# Patient Record
Sex: MALE | Race: WHITE | Employment: FULL TIME | URBAN - NONMETROPOLITAN AREA
[De-identification: names, ages, dates, MRNs, and addresses within clinical notes are randomized per-mention and may not be internally consistent; named-entity substitution may affect disease eponyms.]

---

## 2022-12-02 ENCOUNTER — HOSPITAL ENCOUNTER (OUTPATIENT)
Dept: PREADMISSION TESTING | Age: 57
End: 2022-12-02
Attending: ORTHOPAEDIC SURGERY | Admitting: ORTHOPAEDIC SURGERY
Payer: COMMERCIAL

## 2022-12-02 VITALS
SYSTOLIC BLOOD PRESSURE: 174 MMHG | OXYGEN SATURATION: 97 % | WEIGHT: 285.2 LBS | TEMPERATURE: 97.6 F | DIASTOLIC BLOOD PRESSURE: 101 MMHG | BODY MASS INDEX: 42.24 KG/M2 | HEART RATE: 66 BPM | HEIGHT: 69 IN | RESPIRATION RATE: 20 BRPM

## 2022-12-02 LAB
ABO/RH: NORMAL
ABSOLUTE EOS #: 0.27 K/UL (ref 0–0.44)
ABSOLUTE IMMATURE GRANULOCYTE: <0.03 K/UL (ref 0–0.3)
ABSOLUTE LYMPH #: 1.92 K/UL (ref 1.1–3.7)
ABSOLUTE MONO #: 0.48 K/UL (ref 0.1–1.2)
ANION GAP SERPL CALCULATED.3IONS-SCNC: 9 MMOL/L (ref 9–17)
ANTIBODY SCREEN: NEGATIVE
ARM BAND NUMBER: NORMAL
BASOPHILS # BLD: 1 % (ref 0–2)
BASOPHILS ABSOLUTE: 0.08 K/UL (ref 0–0.2)
BUN BLDV-MCNC: 17 MG/DL (ref 6–20)
BUN/CREAT BLD: 17 (ref 9–20)
CALCIUM SERPL-MCNC: 9.8 MG/DL (ref 8.6–10.4)
CHLORIDE BLD-SCNC: 101 MMOL/L (ref 98–107)
CO2: 28 MMOL/L (ref 20–31)
CREAT SERPL-MCNC: 1.01 MG/DL (ref 0.7–1.2)
EOSINOPHILS RELATIVE PERCENT: 4 % (ref 1–4)
EXPIRATION DATE: NORMAL
GFR SERPL CREATININE-BSD FRML MDRD: >60 ML/MIN/1.73M2
GLUCOSE BLD-MCNC: 146 MG/DL (ref 70–99)
HCT VFR BLD CALC: 46.3 % (ref 40.7–50.3)
HEMOGLOBIN: 15.3 G/DL (ref 13–17)
IMMATURE GRANULOCYTES: 0 %
LYMPHOCYTES # BLD: 29 % (ref 24–43)
MCH RBC QN AUTO: 29.8 PG (ref 25.2–33.5)
MCHC RBC AUTO-ENTMCNC: 33 G/DL (ref 28.4–34.8)
MCV RBC AUTO: 90.3 FL (ref 82.6–102.9)
MONOCYTES # BLD: 7 % (ref 3–12)
NRBC AUTOMATED: 0 PER 100 WBC
PDW BLD-RTO: 12.7 % (ref 11.8–14.4)
PLATELET # BLD: 316 K/UL (ref 138–453)
PMV BLD AUTO: 10.3 FL (ref 8.1–13.5)
POTASSIUM SERPL-SCNC: 3.8 MMOL/L (ref 3.7–5.3)
RBC # BLD: 5.13 M/UL (ref 4.21–5.77)
SEG NEUTROPHILS: 59 % (ref 36–65)
SEGMENTED NEUTROPHILS ABSOLUTE COUNT: 3.92 K/UL (ref 1.5–8.1)
SODIUM BLD-SCNC: 138 MMOL/L (ref 135–144)
WBC # BLD: 6.7 K/UL (ref 3.5–11.3)

## 2022-12-02 PROCEDURE — 86900 BLOOD TYPING SEROLOGIC ABO: CPT

## 2022-12-02 PROCEDURE — 87641 MR-STAPH DNA AMP PROBE: CPT

## 2022-12-02 PROCEDURE — 36415 COLL VENOUS BLD VENIPUNCTURE: CPT

## 2022-12-02 PROCEDURE — 83036 HEMOGLOBIN GLYCOSYLATED A1C: CPT

## 2022-12-02 PROCEDURE — 85025 COMPLETE CBC W/AUTO DIFF WBC: CPT

## 2022-12-02 PROCEDURE — 86850 RBC ANTIBODY SCREEN: CPT

## 2022-12-02 PROCEDURE — 93005 ELECTROCARDIOGRAM TRACING: CPT | Performed by: ORTHOPAEDIC SURGERY

## 2022-12-02 PROCEDURE — 80048 BASIC METABOLIC PNL TOTAL CA: CPT

## 2022-12-02 PROCEDURE — 86901 BLOOD TYPING SEROLOGIC RH(D): CPT

## 2022-12-02 RX ORDER — CELECOXIB 200 MG/1
400 CAPSULE ORAL ONCE
OUTPATIENT
Start: 2022-12-02 | End: 2022-12-02

## 2022-12-02 RX ORDER — HYDROCHLOROTHIAZIDE 25 MG/1
25 TABLET ORAL DAILY
COMMUNITY

## 2022-12-02 RX ORDER — CEFAZOLIN SODIUM 1 G/50ML
1000 SOLUTION INTRAVENOUS ONCE
OUTPATIENT
Start: 2022-12-02 | End: 2022-12-02

## 2022-12-02 RX ORDER — METOPROLOL TARTRATE 50 MG/1
50 TABLET, FILM COATED ORAL DAILY
COMMUNITY

## 2022-12-02 RX ORDER — SIMVASTATIN 20 MG
20 TABLET ORAL DAILY
COMMUNITY

## 2022-12-02 RX ORDER — GLIPIZIDE 10 MG/1
10 TABLET, FILM COATED, EXTENDED RELEASE ORAL DAILY
COMMUNITY

## 2022-12-02 RX ORDER — TRANEXAMIC ACID 650 MG/1
1300 TABLET ORAL ONCE
OUTPATIENT
Start: 2022-12-02 | End: 2022-12-02

## 2022-12-02 RX ORDER — SODIUM CHLORIDE, SODIUM LACTATE, POTASSIUM CHLORIDE, CALCIUM CHLORIDE 600; 310; 30; 20 MG/100ML; MG/100ML; MG/100ML; MG/100ML
INJECTION, SOLUTION INTRAVENOUS CONTINUOUS
OUTPATIENT
Start: 2022-12-02

## 2022-12-02 RX ORDER — ACETAMINOPHEN 325 MG/1
650 TABLET ORAL ONCE
OUTPATIENT
Start: 2022-12-02 | End: 2022-12-02

## 2022-12-02 RX ORDER — COVID-19 ANTIGEN TEST
KIT MISCELLANEOUS
COMMUNITY

## 2022-12-02 ASSESSMENT — PAIN DESCRIPTION - PAIN TYPE: TYPE: CHRONIC PAIN

## 2022-12-02 NOTE — PROGRESS NOTES
Patient instructed [facility]   Preadmission Testing    Name: Cherelle Neville  : 1965  Patient Phone: [phone] (home) [phone] (work)    Procedure left tka  Date of Procedure: 22  Surgeon: Liliana Moctezuma MD    Ht:  5' 9\" (175.3 cm)  Wt: 285 lb 3.2 oz (129.4 kg)  Wt method: Actual    Allergies: No Known Allergies    Peanut allergy: No         Vitals:    22 1308   BP: (!) 163/99   Pulse: 66   Resp: 20   Temp: 97.6 °F (36.4 °C)   SpO2: 97%       No LMP for male patient. Do you take blood thinners? [] Yes    [x] No         Instructed to stop blood thinners prior to procedure? [x] Yes    [] No      [] N/A   Do you have sleep apnea? [] Yes    [x] No     Instructed to bring CPAP machine? [] Yes    [] No    [x] N/A   Do you have acid reflux ? [] Yes    [x] No     Do you have  hiatal hernia? [] Yes    [x] No    Do you ever experience motion sickness? [] Yes    [x] No     Have you had a respiratory infection or sore throat in last 4 weeks before surgery? [] Yes    [x] No     Do you have poorly controlled asthma or COPD? [] Yes    [x] No     Do you have a history of angina in the last month or symptomatic arrhythmia? [] Yes    [x] No     Do you have significant central nervous system disease? [] Yes    [x] No     Have you had an EKG, labs, or chest xray in last 12 months? If yes provide copies to anesthesia   [] Yes    [x] No       [] Lab    [] EKG    [] CXR     Have you had a stress test?     [x] Yes    [] No    When/where: 5 years ago [facility]    Was it normal?    [x] Yes    [] No   Do you or your family have a history of Malignant Hyperthermia? [] Yes    [x] No     Patient instructed on:   [x] NPO Status   [x] Meds to Take Day of Surgery  [x] Ride Home  [x]No Jewelry/Contact Lenses/Dentures day of surgery   [x] Chlorhexidene                       Patient instructed on the pre-operative, intra-operative, and post-operative process?    Yes  Medication instructions reviewed with patient? Yes  Pre operative instruction sheet reviewed and given to patient in PAT?   Yes

## 2022-12-03 LAB
MRSA, DNA, NASAL: NEGATIVE
SPECIMEN DESCRIPTION: NORMAL

## 2022-12-04 LAB
EKG ATRIAL RATE: 66 BPM
EKG P AXIS: 72 DEGREES
EKG P-R INTERVAL: 176 MS
EKG Q-T INTERVAL: 428 MS
EKG QRS DURATION: 100 MS
EKG QTC CALCULATION (BAZETT): 448 MS
EKG R AXIS: 29 DEGREES
EKG T AXIS: 17 DEGREES
EKG VENTRICULAR RATE: 66 BPM
ESTIMATED AVERAGE GLUCOSE: 120 MG/DL
HBA1C MFR BLD: 5.8 % (ref 4–6)

## 2022-12-04 PROCEDURE — 93010 ELECTROCARDIOGRAM REPORT: CPT | Performed by: INTERNAL MEDICINE

## 2022-12-16 ENCOUNTER — ANESTHESIA EVENT (OUTPATIENT)
Dept: OPERATING ROOM | Age: 57
End: 2022-12-16
Payer: COMMERCIAL

## 2022-12-19 ENCOUNTER — ANESTHESIA (OUTPATIENT)
Dept: OPERATING ROOM | Age: 57
End: 2022-12-19
Payer: COMMERCIAL

## 2022-12-19 ENCOUNTER — HOSPITAL ENCOUNTER (OUTPATIENT)
Age: 57
Setting detail: OBSERVATION
Discharge: HOME | End: 2022-12-20
Attending: ORTHOPAEDIC SURGERY | Admitting: ORTHOPAEDIC SURGERY
Payer: COMMERCIAL

## 2022-12-19 DIAGNOSIS — Z96.652 S/P TOTAL KNEE REPLACEMENT USING CEMENT, LEFT: Primary | ICD-10-CM

## 2022-12-19 PROBLEM — I10 HYPERTENSION: Status: ACTIVE | Noted: 2022-12-19

## 2022-12-19 PROBLEM — E11.9 DIABETES MELLITUS (HCC): Status: ACTIVE | Noted: 2022-12-19

## 2022-12-19 LAB
GLUCOSE BLD-MCNC: 244 MG/DL (ref 74–100)
GLUCOSE BLD-MCNC: 384 MG/DL (ref 74–100)

## 2022-12-19 PROCEDURE — 6360000002 HC RX W HCPCS: Performed by: ORTHOPAEDIC SURGERY

## 2022-12-19 PROCEDURE — 6370000000 HC RX 637 (ALT 250 FOR IP): Performed by: NURSE PRACTITIONER

## 2022-12-19 PROCEDURE — 6370000000 HC RX 637 (ALT 250 FOR IP): Performed by: ORTHOPAEDIC SURGERY

## 2022-12-19 PROCEDURE — 2580000003 HC RX 258: Performed by: NURSE ANESTHETIST, CERTIFIED REGISTERED

## 2022-12-19 PROCEDURE — 36415 COLL VENOUS BLD VENIPUNCTURE: CPT

## 2022-12-19 PROCEDURE — 82947 ASSAY GLUCOSE BLOOD QUANT: CPT

## 2022-12-19 PROCEDURE — 6360000002 HC RX W HCPCS: Performed by: NURSE ANESTHETIST, CERTIFIED REGISTERED

## 2022-12-19 PROCEDURE — 2580000003 HC RX 258: Performed by: ORTHOPAEDIC SURGERY

## 2022-12-19 PROCEDURE — 76942 ECHO GUIDE FOR BIOPSY: CPT | Performed by: NURSE ANESTHETIST, CERTIFIED REGISTERED

## 2022-12-19 PROCEDURE — G0378 HOSPITAL OBSERVATION PER HR: HCPCS

## 2022-12-19 PROCEDURE — 2500000003 HC RX 250 WO HCPCS: Performed by: NURSE ANESTHETIST, CERTIFIED REGISTERED

## 2022-12-19 PROCEDURE — 6370000000 HC RX 637 (ALT 250 FOR IP): Performed by: NURSE ANESTHETIST, CERTIFIED REGISTERED

## 2022-12-19 PROCEDURE — A4216 STERILE WATER/SALINE, 10 ML: HCPCS | Performed by: ORTHOPAEDIC SURGERY

## 2022-12-19 PROCEDURE — 97161 PT EVAL LOW COMPLEX 20 MIN: CPT

## 2022-12-19 PROCEDURE — 83036 HEMOGLOBIN GLYCOSYLATED A1C: CPT

## 2022-12-19 PROCEDURE — A4216 STERILE WATER/SALINE, 10 ML: HCPCS | Performed by: NURSE ANESTHETIST, CERTIFIED REGISTERED

## 2022-12-19 PROCEDURE — 97166 OT EVAL MOD COMPLEX 45 MIN: CPT

## 2022-12-19 PROCEDURE — 2500000003 HC RX 250 WO HCPCS: Performed by: ORTHOPAEDIC SURGERY

## 2022-12-19 RX ORDER — KETOROLAC TROMETHAMINE 30 MG/ML
INJECTION, SOLUTION INTRAMUSCULAR; INTRAVENOUS PRN
Status: DISCONTINUED | OUTPATIENT
Start: 2022-12-19 | End: 2022-12-19 | Stop reason: SDUPTHER

## 2022-12-19 RX ORDER — ENOXAPARIN SODIUM 100 MG/ML
30 INJECTION SUBCUTANEOUS 2 TIMES DAILY
Status: DISCONTINUED | OUTPATIENT
Start: 2022-12-20 | End: 2022-12-20 | Stop reason: HOSPADM

## 2022-12-19 RX ORDER — HYDROCODONE BITARTRATE AND ACETAMINOPHEN 5; 325 MG/1; MG/1
1 TABLET ORAL EVERY 4 HOURS PRN
Status: DISCONTINUED | OUTPATIENT
Start: 2022-12-19 | End: 2022-12-20 | Stop reason: HOSPADM

## 2022-12-19 RX ORDER — SODIUM CHLORIDE 9 MG/ML
INJECTION INTRAVENOUS PRN
Status: DISCONTINUED | OUTPATIENT
Start: 2022-12-19 | End: 2022-12-19 | Stop reason: SDUPTHER

## 2022-12-19 RX ORDER — GENTAMICIN SULFATE 40 MG/ML
INJECTION, SOLUTION INTRAMUSCULAR; INTRAVENOUS PRN
Status: DISCONTINUED | OUTPATIENT
Start: 2022-12-19 | End: 2022-12-19 | Stop reason: ALTCHOICE

## 2022-12-19 RX ORDER — HYDROCODONE BITARTRATE AND ACETAMINOPHEN 5; 325 MG/1; MG/1
1 TABLET ORAL EVERY 4 HOURS PRN
Status: DISCONTINUED | OUTPATIENT
Start: 2022-12-19 | End: 2022-12-19

## 2022-12-19 RX ORDER — SODIUM CHLORIDE 0.9 % (FLUSH) 0.9 %
5-40 SYRINGE (ML) INJECTION EVERY 12 HOURS SCHEDULED
Status: DISCONTINUED | OUTPATIENT
Start: 2022-12-19 | End: 2022-12-19 | Stop reason: HOSPADM

## 2022-12-19 RX ORDER — GLIPIZIDE 10 MG/1
10 TABLET ORAL 2 TIMES DAILY
COMMUNITY
Start: 2021-11-05

## 2022-12-19 RX ORDER — SODIUM CHLORIDE 0.9 % (FLUSH) 0.9 %
5-40 SYRINGE (ML) INJECTION PRN
Status: DISCONTINUED | OUTPATIENT
Start: 2022-12-19 | End: 2022-12-20 | Stop reason: HOSPADM

## 2022-12-19 RX ORDER — HYDROCHLOROTHIAZIDE 25 MG/1
25 TABLET ORAL DAILY
Status: DISCONTINUED | OUTPATIENT
Start: 2022-12-19 | End: 2022-12-20 | Stop reason: HOSPADM

## 2022-12-19 RX ORDER — DIMENHYDRINATE 50 MG/1
50 TABLET ORAL ONCE
Status: COMPLETED | OUTPATIENT
Start: 2022-12-19 | End: 2022-12-19

## 2022-12-19 RX ORDER — ACETAMINOPHEN 325 MG/1
650 TABLET ORAL EVERY 6 HOURS PRN
Status: DISCONTINUED | OUTPATIENT
Start: 2022-12-19 | End: 2022-12-20 | Stop reason: HOSPADM

## 2022-12-19 RX ORDER — FENTANYL CITRATE 50 UG/ML
25 INJECTION, SOLUTION INTRAMUSCULAR; INTRAVENOUS EVERY 5 MIN PRN
Status: DISCONTINUED | OUTPATIENT
Start: 2022-12-19 | End: 2022-12-19 | Stop reason: HOSPADM

## 2022-12-19 RX ORDER — FENTANYL CITRATE 50 UG/ML
INJECTION, SOLUTION INTRAMUSCULAR; INTRAVENOUS PRN
Status: DISCONTINUED | OUTPATIENT
Start: 2022-12-19 | End: 2022-12-19 | Stop reason: SDUPTHER

## 2022-12-19 RX ORDER — HYDROCODONE BITARTRATE AND ACETAMINOPHEN 5; 325 MG/1; MG/1
2 TABLET ORAL EVERY 6 HOURS PRN
Status: DISCONTINUED | OUTPATIENT
Start: 2022-12-19 | End: 2022-12-19

## 2022-12-19 RX ORDER — ONDANSETRON 2 MG/ML
4 INJECTION INTRAMUSCULAR; INTRAVENOUS EVERY 6 HOURS PRN
Status: DISCONTINUED | OUTPATIENT
Start: 2022-12-19 | End: 2022-12-20 | Stop reason: HOSPADM

## 2022-12-19 RX ORDER — LIDOCAINE HYDROCHLORIDE 20 MG/ML
INJECTION, SOLUTION EPIDURAL; INFILTRATION; INTRACAUDAL; PERINEURAL PRN
Status: DISCONTINUED | OUTPATIENT
Start: 2022-12-19 | End: 2022-12-19 | Stop reason: SDUPTHER

## 2022-12-19 RX ORDER — ONDANSETRON 2 MG/ML
4 INJECTION INTRAMUSCULAR; INTRAVENOUS
Status: DISCONTINUED | OUTPATIENT
Start: 2022-12-19 | End: 2022-12-19 | Stop reason: HOSPADM

## 2022-12-19 RX ORDER — NEOSTIGMINE METHYLSULFATE 1 MG/ML
INJECTION, SOLUTION INTRAVENOUS PRN
Status: DISCONTINUED | OUTPATIENT
Start: 2022-12-19 | End: 2022-12-19 | Stop reason: SDUPTHER

## 2022-12-19 RX ORDER — ONDANSETRON 2 MG/ML
INJECTION INTRAMUSCULAR; INTRAVENOUS PRN
Status: DISCONTINUED | OUTPATIENT
Start: 2022-12-19 | End: 2022-12-19 | Stop reason: SDUPTHER

## 2022-12-19 RX ORDER — BUPIVACAINE/KETOROLAC/KETAMINE 150-60/50
SYRINGE (ML) INJECTION PRN
Status: DISCONTINUED | OUTPATIENT
Start: 2022-12-19 | End: 2022-12-19 | Stop reason: ALTCHOICE

## 2022-12-19 RX ORDER — DEXTROSE MONOHYDRATE 100 MG/ML
INJECTION, SOLUTION INTRAVENOUS CONTINUOUS PRN
Status: DISCONTINUED | OUTPATIENT
Start: 2022-12-19 | End: 2022-12-20 | Stop reason: HOSPADM

## 2022-12-19 RX ORDER — PROPOFOL 10 MG/ML
INJECTION, EMULSION INTRAVENOUS PRN
Status: DISCONTINUED | OUTPATIENT
Start: 2022-12-19 | End: 2022-12-19 | Stop reason: SDUPTHER

## 2022-12-19 RX ORDER — HYDROCODONE BITARTRATE AND ACETAMINOPHEN 5; 325 MG/1; MG/1
2 TABLET ORAL EVERY 6 HOURS PRN
Status: DISCONTINUED | OUTPATIENT
Start: 2022-12-19 | End: 2022-12-20 | Stop reason: HOSPADM

## 2022-12-19 RX ORDER — GLIPIZIDE 5 MG/1
10 TABLET ORAL 2 TIMES DAILY
Status: DISCONTINUED | OUTPATIENT
Start: 2022-12-19 | End: 2022-12-20 | Stop reason: HOSPADM

## 2022-12-19 RX ORDER — SODIUM CHLORIDE 0.9 % (FLUSH) 0.9 %
5-40 SYRINGE (ML) INJECTION PRN
Status: DISCONTINUED | OUTPATIENT
Start: 2022-12-19 | End: 2022-12-19 | Stop reason: HOSPADM

## 2022-12-19 RX ORDER — MIDAZOLAM HYDROCHLORIDE 1 MG/ML
INJECTION INTRAMUSCULAR; INTRAVENOUS PRN
Status: DISCONTINUED | OUTPATIENT
Start: 2022-12-19 | End: 2022-12-19 | Stop reason: SDUPTHER

## 2022-12-19 RX ORDER — DEXAMETHASONE SODIUM PHOSPHATE 4 MG/ML
INJECTION, SOLUTION INTRA-ARTICULAR; INTRALESIONAL; INTRAMUSCULAR; INTRAVENOUS; SOFT TISSUE PRN
Status: DISCONTINUED | OUTPATIENT
Start: 2022-12-19 | End: 2022-12-19 | Stop reason: SDUPTHER

## 2022-12-19 RX ORDER — TRANEXAMIC ACID 650 MG/1
1300 TABLET ORAL ONCE
Status: COMPLETED | OUTPATIENT
Start: 2022-12-19 | End: 2022-12-19

## 2022-12-19 RX ORDER — SODIUM CHLORIDE 9 MG/ML
INJECTION, SOLUTION INTRAVENOUS PRN
Status: DISCONTINUED | OUTPATIENT
Start: 2022-12-19 | End: 2022-12-19 | Stop reason: HOSPADM

## 2022-12-19 RX ORDER — DEXAMETHASONE SODIUM PHOSPHATE 10 MG/ML
INJECTION, SOLUTION INTRAMUSCULAR; INTRAVENOUS PRN
Status: DISCONTINUED | OUTPATIENT
Start: 2022-12-19 | End: 2022-12-19 | Stop reason: SDUPTHER

## 2022-12-19 RX ORDER — CELECOXIB 200 MG/1
400 CAPSULE ORAL ONCE
Status: DISCONTINUED | OUTPATIENT
Start: 2022-12-19 | End: 2022-12-19 | Stop reason: ALTCHOICE

## 2022-12-19 RX ORDER — CELECOXIB 200 MG/1
400 CAPSULE ORAL ONCE
Status: COMPLETED | OUTPATIENT
Start: 2022-12-19 | End: 2022-12-19

## 2022-12-19 RX ORDER — INSULIN LISPRO 100 [IU]/ML
0-4 INJECTION, SOLUTION INTRAVENOUS; SUBCUTANEOUS
Status: DISCONTINUED | OUTPATIENT
Start: 2022-12-19 | End: 2022-12-20 | Stop reason: HOSPADM

## 2022-12-19 RX ORDER — METOPROLOL TARTRATE 50 MG/1
50 TABLET, FILM COATED ORAL 2 TIMES DAILY
Status: DISCONTINUED | OUTPATIENT
Start: 2022-12-19 | End: 2022-12-20 | Stop reason: HOSPADM

## 2022-12-19 RX ORDER — GENTAMICIN SULFATE 40 MG/ML
INJECTION, SOLUTION INTRAMUSCULAR; INTRAVENOUS
Status: DISCONTINUED
Start: 2022-12-19 | End: 2022-12-19

## 2022-12-19 RX ORDER — ATORVASTATIN CALCIUM 10 MG/1
10 TABLET, FILM COATED ORAL DAILY
Status: DISCONTINUED | OUTPATIENT
Start: 2022-12-19 | End: 2022-12-20 | Stop reason: HOSPADM

## 2022-12-19 RX ORDER — HYDROMORPHONE HCL 110MG/55ML
0.5 PATIENT CONTROLLED ANALGESIA SYRINGE INTRAVENOUS
Status: DISCONTINUED | OUTPATIENT
Start: 2022-12-19 | End: 2022-12-20 | Stop reason: HOSPADM

## 2022-12-19 RX ORDER — ACETAMINOPHEN 325 MG/1
650 TABLET ORAL EVERY 6 HOURS
Status: DISCONTINUED | OUTPATIENT
Start: 2022-12-19 | End: 2022-12-20 | Stop reason: HOSPADM

## 2022-12-19 RX ORDER — TRANEXAMIC ACID 100 MG/ML
INJECTION, SOLUTION INTRAVENOUS
Status: DISCONTINUED
Start: 2022-12-19 | End: 2022-12-19 | Stop reason: WASHOUT

## 2022-12-19 RX ORDER — ROPIVACAINE HYDROCHLORIDE 5 MG/ML
INJECTION, SOLUTION EPIDURAL; INFILTRATION; PERINEURAL PRN
Status: DISCONTINUED | OUTPATIENT
Start: 2022-12-19 | End: 2022-12-19 | Stop reason: SDUPTHER

## 2022-12-19 RX ORDER — SODIUM CHLORIDE 0.9 % (FLUSH) 0.9 %
5-40 SYRINGE (ML) INJECTION EVERY 12 HOURS SCHEDULED
Status: DISCONTINUED | OUTPATIENT
Start: 2022-12-19 | End: 2022-12-20 | Stop reason: HOSPADM

## 2022-12-19 RX ORDER — VANCOMYCIN HYDROCHLORIDE 1 G/20ML
INJECTION, POWDER, LYOPHILIZED, FOR SOLUTION INTRAVENOUS PRN
Status: DISCONTINUED | OUTPATIENT
Start: 2022-12-19 | End: 2022-12-19 | Stop reason: ALTCHOICE

## 2022-12-19 RX ORDER — INSULIN LISPRO 100 [IU]/ML
0-4 INJECTION, SOLUTION INTRAVENOUS; SUBCUTANEOUS NIGHTLY
Status: DISCONTINUED | OUTPATIENT
Start: 2022-12-19 | End: 2022-12-20 | Stop reason: HOSPADM

## 2022-12-19 RX ORDER — VANCOMYCIN HYDROCHLORIDE 1 G/20ML
INJECTION, POWDER, LYOPHILIZED, FOR SOLUTION INTRAVENOUS
Status: DISCONTINUED
Start: 2022-12-19 | End: 2022-12-19

## 2022-12-19 RX ORDER — SODIUM CHLORIDE, SODIUM LACTATE, POTASSIUM CHLORIDE, CALCIUM CHLORIDE 600; 310; 30; 20 MG/100ML; MG/100ML; MG/100ML; MG/100ML
INJECTION, SOLUTION INTRAVENOUS CONTINUOUS
Status: DISCONTINUED | OUTPATIENT
Start: 2022-12-19 | End: 2022-12-20 | Stop reason: HOSPADM

## 2022-12-19 RX ORDER — TRANEXAMIC ACID 650 MG/1
1300 TABLET ORAL ONCE
Status: COMPLETED | OUTPATIENT
Start: 2022-12-20 | End: 2022-12-20

## 2022-12-19 RX ORDER — ROCURONIUM BROMIDE 10 MG/ML
INJECTION, SOLUTION INTRAVENOUS PRN
Status: DISCONTINUED | OUTPATIENT
Start: 2022-12-19 | End: 2022-12-19 | Stop reason: SDUPTHER

## 2022-12-19 RX ORDER — SODIUM CHLORIDE, SODIUM LACTATE, POTASSIUM CHLORIDE, CALCIUM CHLORIDE 600; 310; 30; 20 MG/100ML; MG/100ML; MG/100ML; MG/100ML
INJECTION, SOLUTION INTRAVENOUS CONTINUOUS
Status: DISCONTINUED | OUTPATIENT
Start: 2022-12-19 | End: 2022-12-19

## 2022-12-19 RX ORDER — SODIUM CHLORIDE 9 MG/ML
INJECTION INTRAVENOUS PRN
Status: DISCONTINUED | OUTPATIENT
Start: 2022-12-19 | End: 2022-12-19 | Stop reason: ALTCHOICE

## 2022-12-19 RX ORDER — BUPIVACAINE/KETOROLAC/KETAMINE 150-60/50
SYRINGE (ML) INJECTION
Status: DISCONTINUED
Start: 2022-12-19 | End: 2022-12-19

## 2022-12-19 RX ORDER — ACETAMINOPHEN 325 MG/1
650 TABLET ORAL ONCE
Status: COMPLETED | OUTPATIENT
Start: 2022-12-19 | End: 2022-12-19

## 2022-12-19 RX ORDER — GLYCOPYRROLATE 0.2 MG/ML
INJECTION INTRAMUSCULAR; INTRAVENOUS PRN
Status: DISCONTINUED | OUTPATIENT
Start: 2022-12-19 | End: 2022-12-19 | Stop reason: SDUPTHER

## 2022-12-19 RX ORDER — PHENYLEPHRINE HYDROCHLORIDE 10 MG/ML
INJECTION INTRAVENOUS PRN
Status: DISCONTINUED | OUTPATIENT
Start: 2022-12-19 | End: 2022-12-19 | Stop reason: SDUPTHER

## 2022-12-19 RX ORDER — SODIUM CHLORIDE 9 MG/ML
INJECTION, SOLUTION INTRAVENOUS PRN
Status: DISCONTINUED | OUTPATIENT
Start: 2022-12-19 | End: 2022-12-20 | Stop reason: HOSPADM

## 2022-12-19 RX ORDER — FENTANYL CITRATE 50 UG/ML
50 INJECTION, SOLUTION INTRAMUSCULAR; INTRAVENOUS EVERY 5 MIN PRN
Status: DISCONTINUED | OUTPATIENT
Start: 2022-12-19 | End: 2022-12-19 | Stop reason: HOSPADM

## 2022-12-19 RX ORDER — SODIUM CHLORIDE, SODIUM LACTATE, POTASSIUM CHLORIDE, CALCIUM CHLORIDE 600; 310; 30; 20 MG/100ML; MG/100ML; MG/100ML; MG/100ML
INJECTION, SOLUTION INTRAVENOUS CONTINUOUS PRN
Status: DISCONTINUED | OUTPATIENT
Start: 2022-12-19 | End: 2022-12-19 | Stop reason: SDUPTHER

## 2022-12-19 RX ORDER — KETAMINE HCL 50MG/ML(1)
SYRINGE (ML) INTRAVENOUS PRN
Status: DISCONTINUED | OUTPATIENT
Start: 2022-12-19 | End: 2022-12-19 | Stop reason: SDUPTHER

## 2022-12-19 RX ORDER — METOCLOPRAMIDE HYDROCHLORIDE 5 MG/ML
10 INJECTION INTRAMUSCULAR; INTRAVENOUS
Status: DISCONTINUED | OUTPATIENT
Start: 2022-12-19 | End: 2022-12-19 | Stop reason: HOSPADM

## 2022-12-19 RX ADMIN — GLIPIZIDE 10 MG: 5 TABLET ORAL at 20:47

## 2022-12-19 RX ADMIN — INSULIN LISPRO 4 UNITS: 100 INJECTION, SOLUTION INTRAVENOUS; SUBCUTANEOUS at 22:57

## 2022-12-19 RX ADMIN — TRANEXAMIC ACID 1300 MG: 650 TABLET ORAL at 08:05

## 2022-12-19 RX ADMIN — TRANEXAMIC ACID 1300 MG: 650 TABLET ORAL at 15:14

## 2022-12-19 RX ADMIN — ACETAMINOPHEN 650 MG: 325 TABLET ORAL at 20:47

## 2022-12-19 RX ADMIN — HYDROMORPHONE HYDROCHLORIDE 0.5 MG: 2 INJECTION, SOLUTION INTRAMUSCULAR; INTRAVENOUS; SUBCUTANEOUS at 15:06

## 2022-12-19 RX ADMIN — VANCOMYCIN HYDROCHLORIDE 2000 MG: 1 INJECTION, POWDER, LYOPHILIZED, FOR SOLUTION INTRAVENOUS at 22:52

## 2022-12-19 RX ADMIN — SODIUM CHLORIDE, POTASSIUM CHLORIDE, SODIUM LACTATE AND CALCIUM CHLORIDE: 600; 310; 30; 20 INJECTION, SOLUTION INTRAVENOUS at 15:14

## 2022-12-19 RX ADMIN — PHENYLEPHRINE HYDROCHLORIDE 50 MCG: 10 INJECTION INTRAVENOUS at 11:55

## 2022-12-19 RX ADMIN — PROPOFOL 75 MCG/KG/MIN: 10 INJECTION, EMULSION INTRAVENOUS at 11:06

## 2022-12-19 RX ADMIN — PROPOFOL 250 MG: 10 INJECTION, EMULSION INTRAVENOUS at 09:40

## 2022-12-19 RX ADMIN — FENTANYL CITRATE 50 MCG: 50 INJECTION INTRAMUSCULAR; INTRAVENOUS at 08:40

## 2022-12-19 RX ADMIN — SODIUM CHLORIDE 10 ML: 9 INJECTION INTRAMUSCULAR; INTRAVENOUS; SUBCUTANEOUS at 08:45

## 2022-12-19 RX ADMIN — FENTANYL CITRATE 50 MCG: 50 INJECTION INTRAMUSCULAR; INTRAVENOUS at 09:40

## 2022-12-19 RX ADMIN — ACETAMINOPHEN 650 MG: 325 TABLET ORAL at 15:14

## 2022-12-19 RX ADMIN — KETOROLAC TROMETHAMINE 30 MG: 30 INJECTION, SOLUTION INTRAMUSCULAR; INTRAVENOUS at 12:12

## 2022-12-19 RX ADMIN — HYDROCHLOROTHIAZIDE 25 MG: 25 TABLET ORAL at 16:27

## 2022-12-19 RX ADMIN — LIDOCAINE HYDROCHLORIDE 5 ML: 20 INJECTION, SOLUTION EPIDURAL; INFILTRATION; INTRACAUDAL; PERINEURAL at 09:40

## 2022-12-19 RX ADMIN — PHENYLEPHRINE HYDROCHLORIDE 100 MCG: 10 INJECTION INTRAVENOUS at 12:13

## 2022-12-19 RX ADMIN — SODIUM CHLORIDE, POTASSIUM CHLORIDE, SODIUM LACTATE AND CALCIUM CHLORIDE: 600; 310; 30; 20 INJECTION, SOLUTION INTRAVENOUS at 08:04

## 2022-12-19 RX ADMIN — MIDAZOLAM 2 MG: 1 INJECTION INTRAMUSCULAR; INTRAVENOUS at 08:40

## 2022-12-19 RX ADMIN — METOPROLOL TARTRATE 50 MG: 50 TABLET, FILM COATED ORAL at 20:47

## 2022-12-19 RX ADMIN — Medication 3000 MG: at 09:12

## 2022-12-19 RX ADMIN — METFORMIN HYDROCHLORIDE 500 MG: 500 TABLET ORAL at 17:23

## 2022-12-19 RX ADMIN — INSULIN LISPRO 1 UNITS: 100 INJECTION, SOLUTION INTRAVENOUS; SUBCUTANEOUS at 18:48

## 2022-12-19 RX ADMIN — DEXAMETHASONE SODIUM PHOSPHATE 10 MG: 10 INJECTION, SOLUTION INTRAMUSCULAR; INTRAVENOUS at 08:45

## 2022-12-19 RX ADMIN — ROPIVACAINE HYDROCHLORIDE 30 ML: 5 INJECTION EPIDURAL; INFILTRATION; PERINEURAL at 08:45

## 2022-12-19 RX ADMIN — ACETAMINOPHEN 650 MG: 325 TABLET ORAL at 08:05

## 2022-12-19 RX ADMIN — ONDANSETRON 4 MG: 2 INJECTION INTRAMUSCULAR; INTRAVENOUS at 09:53

## 2022-12-19 RX ADMIN — ROCURONIUM BROMIDE 50 MG: 10 INJECTION, SOLUTION INTRAVENOUS at 09:41

## 2022-12-19 RX ADMIN — NEOSTIGMINE METHYLSULFATE 3 MG: 1 INJECTION INTRAVENOUS at 12:16

## 2022-12-19 RX ADMIN — DIMENHYDRINATE 50 MG: 50 TABLET ORAL at 08:05

## 2022-12-19 RX ADMIN — SODIUM CHLORIDE, POTASSIUM CHLORIDE, SODIUM LACTATE AND CALCIUM CHLORIDE: 600; 310; 30; 20 INJECTION, SOLUTION INTRAVENOUS at 09:15

## 2022-12-19 RX ADMIN — DEXAMETHASONE SODIUM PHOSPHATE 4 MG: 4 INJECTION, SOLUTION INTRAMUSCULAR; INTRAVENOUS at 09:53

## 2022-12-19 RX ADMIN — SODIUM CHLORIDE, POTASSIUM CHLORIDE, SODIUM LACTATE AND CALCIUM CHLORIDE: 600; 310; 30; 20 INJECTION, SOLUTION INTRAVENOUS at 11:38

## 2022-12-19 RX ADMIN — PHENYLEPHRINE HYDROCHLORIDE 100 MCG: 10 INJECTION INTRAVENOUS at 12:24

## 2022-12-19 RX ADMIN — Medication 25 MG: at 10:12

## 2022-12-19 RX ADMIN — ATORVASTATIN CALCIUM 10 MG: 10 TABLET, FILM COATED ORAL at 16:27

## 2022-12-19 RX ADMIN — PHENYLEPHRINE HYDROCHLORIDE 100 MCG: 10 INJECTION INTRAVENOUS at 12:20

## 2022-12-19 RX ADMIN — CEFAZOLIN 1000 MG: 1 INJECTION, POWDER, FOR SOLUTION INTRAMUSCULAR; INTRAVENOUS; PARENTERAL at 12:15

## 2022-12-19 RX ADMIN — GLYCOPYRROLATE 0.6 MG: 0.2 INJECTION INTRAMUSCULAR; INTRAVENOUS at 12:16

## 2022-12-19 RX ADMIN — Medication 25 MG: at 10:51

## 2022-12-19 RX ADMIN — CELECOXIB 400 MG: 200 CAPSULE ORAL at 08:05

## 2022-12-19 ASSESSMENT — PAIN DESCRIPTION - FREQUENCY
FREQUENCY: CONTINUOUS
FREQUENCY: CONTINUOUS

## 2022-12-19 ASSESSMENT — PAIN DESCRIPTION - LOCATION
LOCATION: KNEE

## 2022-12-19 ASSESSMENT — PAIN DESCRIPTION - DESCRIPTORS
DESCRIPTORS: ACHING
DESCRIPTORS: ACHING;SORE
DESCRIPTORS: ACHING
DESCRIPTORS: ACHING
DESCRIPTORS: ACHING;DISCOMFORT
DESCRIPTORS: ACHING;DISCOMFORT
DESCRIPTORS: ACHING

## 2022-12-19 ASSESSMENT — PAIN DESCRIPTION - ORIENTATION
ORIENTATION: LEFT

## 2022-12-19 ASSESSMENT — PAIN DESCRIPTION - PAIN TYPE
TYPE: SURGICAL PAIN

## 2022-12-19 ASSESSMENT — PAIN DESCRIPTION - ONSET
ONSET: ON-GOING
ONSET: ON-GOING

## 2022-12-19 ASSESSMENT — PAIN - FUNCTIONAL ASSESSMENT
PAIN_FUNCTIONAL_ASSESSMENT: 0-10
PAIN_FUNCTIONAL_ASSESSMENT: PREVENTS OR INTERFERES WITH ALL ACTIVE AND SOME PASSIVE ACTIVITIES
PAIN_FUNCTIONAL_ASSESSMENT: PREVENTS OR INTERFERES WITH ALL ACTIVE AND SOME PASSIVE ACTIVITIES
PAIN_FUNCTIONAL_ASSESSMENT: PREVENTS OR INTERFERES SOME ACTIVE ACTIVITIES AND ADLS
PAIN_FUNCTIONAL_ASSESSMENT: PREVENTS OR INTERFERES SOME ACTIVE ACTIVITIES AND ADLS

## 2022-12-19 ASSESSMENT — PAIN SCALES - GENERAL
PAINLEVEL_OUTOF10: 0
PAINLEVEL_OUTOF10: 4
PAINLEVEL_OUTOF10: 7
PAINLEVEL_OUTOF10: 7
PAINLEVEL_OUTOF10: 2

## 2022-12-19 NOTE — ANESTHESIA PRE PROCEDURE
Department of Anesthesiology  Preprocedure Note       Name:  Brittanie Olmedo   Age:  61 y.o.  :  1965                                          MRN:  [id number]         Date:  2022      Surgeon: Kristy Walters):  Zev Armenta MD    Procedure: Procedure(s):  KNEE TOTAL ARTHROPLASTY    Medications prior to admission:   Prior to Admission medications    Medication Sig Start Date End Date Taking?  Authorizing Provider   hydroCHLOROthiazide (HYDRODIURIL) 25 MG tablet Take 25 mg by mouth daily    Historical Provider, MD   metoprolol tartrate (LOPRESSOR) 50 MG tablet Take 50 mg by mouth daily    Historical Provider, MD   simvastatin (ZOCOR) 20 MG tablet Take 20 mg by mouth daily    Historical Provider, MD   metFORMIN (GLUCOPHAGE) 500 MG tablet Take 500 mg by mouth 2 times daily (with meals)    Historical Provider, MD   glipiZIDE (GLUCOTROL XL) 10 MG extended release tablet Take 10 mg by mouth daily    Historical Provider, MD   Acetaminophen (TYLENOL EXTRA STRENGTH PO) Take by mouth    Historical Provider, MD   Naproxen Sodium 220 MG CAPS Take by mouth  Patient not taking: Reported on 2022    Historical Provider, MD       Current medications:    Current Facility-Administered Medications   Medication Dose Route Frequency Provider Last Rate Last Admin    dimenhyDRINATE (DRAMAMINE) tablet 50 mg  50 mg Oral Once ARNULFO Rosado - CRNA        lactated ringers infusion   IntraVENous Continuous Zev Armenta MD        celecoxib (CELEBREX) capsule 400 mg  400 mg Oral Once Zev Armenta MD        tranexamic acid (LYSTEDA) tablet 1,300 mg  1,300 mg Oral Once Zev Armenta MD        acetaminophen (TYLENOL) tablet 650 mg  650 mg Oral Once Zev Armenta MD        ceFAZolin (ANCEF) 3000 mg in dextrose 5 % 100 mL IVPB  3,000 mg IntraVENous Once Zev Armenta MD        ceFAZolin (ANCEF) 1,000 mg in dextrose 5 % 50 mL IVPB (mini-bag)  1,000 mg IntraVENous Once Zev Armetna MD           Allergies:  No Known Allergies    Problem List:  There is no problem list on file for this patient. Past Medical History:        Diagnosis Date    Diabetes mellitus ([address]     Hyperlipidemia     Hypertension        Past Surgical History:        Procedure Laterality Date    COLONOSCOPY  2014    ROTATOR CUFF REPAIR Right        Social History:    Social History     Tobacco Use    Smoking status: Never    Smokeless tobacco: Never   Substance Use Topics    Alcohol use: Yes     Comment: rare                                Counseling given: Not Answered      Vital Signs (Current):   Vitals:    12/19/22 0744   BP: (!) 150/105   Pulse: 62   Resp: 10   Temp: 36.7 °C (98.1 °F)   TempSrc: Temporal   SpO2: 95%   Weight: 283 lb (128.4 kg)   Height: 5' 9\" (1.753 m)                                              BP Readings from Last 3 Encounters:   12/19/22 (!) 150/105   12/02/22 (!) 174/101       NPO Status: Time of last liquid consumption: 1800                        Time of last solid consumption: 1800                        Date of last liquid consumption: 12/18/22                        Date of last solid food consumption: 12/18/22    BMI:   Wt Readings from Last 3 Encounters:   12/19/22 283 lb (128.4 kg)   12/02/22 285 lb 3.2 oz (129.4 kg)     Body mass index is 41.79 kg/m².     CBC:   Lab Results   Component Value Date/Time    WBC 6.7 12/02/2022 01:52 PM    RBC 5.13 12/02/2022 01:52 PM    HGB 15.3 12/02/2022 01:52 PM    HCT 46.3 12/02/2022 01:52 PM    MCV 90.3 12/02/2022 01:52 PM    RDW 12.7 12/02/2022 01:52 PM     12/02/2022 01:52 PM       CMP:   Lab Results   Component Value Date/Time     12/02/2022 01:52 PM    K 3.8 12/02/2022 01:52 PM     12/02/2022 01:52 PM    CO2 28 12/02/2022 01:52 PM    BUN 17 12/02/2022 01:52 PM    CREATININE 1.01 12/02/2022 01:52 PM    LABGLOM >60 12/02/2022 01:52 PM    GLUCOSE 146 12/02/2022 01:52 PM    CALCIUM 9.8 12/02/2022 01:52 PM       POC Tests: No results for input(s): POCGLU, POCNA, POCK, POCCL, POCBUN, POCHEMO, POCHCT in the last 72 hours. Coags: No results found for: PROTIME, INR, APTT    HCG (If Applicable): No results found for: PREGTESTUR, PREGSERUM, HCG, HCGQUANT     ABGs: No results found for: PHART, PO2ART, LIQ1ONI, MCN3LSF, BEART, L2SEDYCW     Type & Screen (If Applicable):  No results found for: LABABO, LABRH    Drug/Infectious Status (If Applicable):  No results found for: HIV, HEPCAB    COVID-19 Screening (If Applicable): No results found for: COVID19        Anesthesia Evaluation  Patient summary reviewed and Nursing notes reviewed no history of anesthetic complications:   Airway: Mallampati: I  TM distance: >3 FB   Neck ROM: full  Mouth opening: > = 3 FB   Dental: normal exam         Pulmonary:Negative Pulmonary ROS and normal exam                               Cardiovascular:  Exercise tolerance: good (>4 METS),   (+) hypertension:, hyperlipidemia    (-) CABG/stent and dysrhythmias    ECG reviewed               Beta Blocker:  Dose within 24 Hrs         Neuro/Psych:   Negative Neuro/Psych ROS              GI/Hepatic/Renal:   (+) morbid obesity          Endo/Other:    (+) DiabetesType II DM, well controlled, , .                 Abdominal:   (+) obese,           Vascular: negative vascular ROS. Other Findings:           Anesthesia Plan      spinal and general     ASA 3       Induction: intravenous. MIPS: Postoperative opioids intended and Prophylactic antiemetics administered. Anesthetic plan and risks discussed with patient. Use of blood products discussed with patient whom consented to blood products. Plan discussed with CRNA.                     ARNULFO Lowe - JON   12/19/2022

## 2022-12-19 NOTE — PROGRESS NOTES
Case Management Assessment  Initial Evaluation    Date/Time of Evaluation: 12/19/2022 5:48 PM  Assessment Completed by: NEGRA Mcmahon    If patient is discharged prior to next notation, then this note serves as note for discharge by case management. Patient Name: Franny Borja                   YOB: 1965  Diagnosis: Osteoarthritis of left knee, unspecified osteoarthritis type [M17.12]  S/P total knee replacement using cement, left [Z96.652]                   Date / Time: 12/19/2022  6:44 AM    Patient Admission Status: Observation   Readmission Risk (Low < 19, Mod (19-27), High > 27): No data recorded  Current PCP: Orlando Anderson MD  PCP verified by CM? Yes    Chart Reviewed: na      History Provided by: Significant Other, Patient  Patient Orientation: Alert and Oriented, Person, Place, Situation, Self    Patient Cognition: Alert    Hospitalization in the last 30 days (Readmission):  No    If yes, Readmission Assessment in  Navigator will be completed. Advance Directives:      Code Status: Full Code   Patient's Primary Decision Maker is: Legal Next of Kin    [facility] - [phone]    Discharge Planning:    Patient lives with: Spouse/Significant Other Type of Home: House  Primary Care Giver: Self  Patient Support Systems include: Spouse/Significant Other, Family Members   Current Financial resources: Other (Comment) (commerical insurance)  Current community resources: None  Current services prior to admission: None            Current DME: none             Type of Home Care services:  PT    ADLS  Prior functional level: Independent in ADLs/IADLs  Current functional level: Independent in ADLs/IADLs    PT AM-PAC:   /24  OT AM-PAC:   /24    Family can provide assistance at DC: Would you like Case Management to discuss the discharge plan with any other family members/significant others, and if so, who?  Yes  Plans to Return to Present Housing: Yes  Other Identified Issues/Barriers to RETURNING to current housing: na  Potential Assistance needed at discharge: [facility], Outpatient PT/OT            Potential DME: Светлана Buckner  Patient expects to discharge to: [facility] for transportation at discharge: spouse     Financial    Payor: [employer] / Plan: Margarita Parson / Product Type: *No Product type* /     Does insurance require precert for SNF: Yes    Potential assistance Purchasing Medications: No  Meds-to-Beds request: luigi       JUANHENRIQUE [address] [phone] Bina Orozco [phone]  [facility] [facility] [address]  Phone: [phone] Fax: [phone]      Notes:    Factors facilitating achievement of predicted outcomes: Family support, Cooperative, and Pleasant    Barriers to discharge: none    Additional Case Management Notes: Patient is  and lives at home with his wife. Patient has a walker for discharge. His wife does the cooking and the house cleaning. He was independent with his ADL's prior to surgery. Patient manages his own medications and drives. He has no outside services in the home. The plan is home with outpatient PT @ [facility]. The Plan for Transition of Care is related to the following treatment goals of Osteoarthritis of left knee, unspecified osteoarthritis type [M17.12]  S/P total knee replacement using cement, left [S56.436]    IF APPLICABLE: The Patient and/or patient representative Gary Bonner and his family were provided with a choice of provider and agrees with the discharge plan. Freedom of choice list with basic dialogue that supports the patient's individualized plan of care/goals and shares the quality data associated with the providers was provided to: na    Patient Representative Name:       The Patient and/or Patient Representative Agree with the Discharge Plan? Yes    The discharge plan is home with outpatient PT at [facility] for this Thursday the 12/22.   No other services needed at this time.      Shaylee Santoyo [place]  Case Management Department  Ph: [phone] Fax: [phone]

## 2022-12-19 NOTE — ANESTHESIA PROCEDURE NOTES
Spinal Block    Patient location during procedure: OR  End time: 12/19/2022 9:33 AM  Reason for block: procedure for pain and primary anesthetic  Staffing  Performed: resident/CRNA   Resident/CRNA: ARNULFO Barlow - CRNA  Spinal Block  Patient position: sitting  Prep: ChloraPrep  Patient monitoring: continuous pulse ox and frequent blood pressure checks  Approach: midline  Location: L3/L4  Provider prep: mask and sterile gloves  Local infiltration: lidocaine  Needle  Needle type: Sprotte Tip   Needle gauge: 25 G  Needle length: 3.5 in  Assessment  Events: failed spinal  Swirl obtained: Yes  CSF: clear  Attempts: 3+  Hemodynamics: stable  Additional Notes  Attempt at L3-4 space able to obtain CSF and  swirl but upon injection- would not inject with ease, resistance met. Stopped injection and attempted at L4-5, unable to place in SAB. Back to L3-4 space and obtained CSF and swirl  able to inject with less resistance as previous attempt 1.8 m of 0.75% bupivacaine 8.25% dextrose. After placement pt laid down and still had motor and sensory. Converted to [place].   Preanesthetic Checklist  Completed: patient identified, IV checked, site marked, risks and benefits discussed, surgical/procedural consents, equipment checked, pre-op evaluation, timeout performed, anesthesia consent given, oxygen available and monitors applied/VS acknowledged

## 2022-12-19 NOTE — ANESTHESIA PROCEDURE NOTES
Peripheral Block    Patient location during procedure: pre-op  Reason for block: post-op pain management and at surgeon's request  Start time: 12/19/2022 8:40 AM  End time: 12/19/2022 8:50 AM  Staffing  Performed: resident/CRNA   Resident/CRNA: ARNULFO Garibay - CRNA  Preanesthetic Checklist  Completed: patient identified, IV checked, site marked, risks and benefits discussed, surgical/procedural consents, equipment checked, pre-op evaluation, timeout performed, anesthesia consent given, oxygen available and monitors applied/VS acknowledged  Peripheral Block   Patient position: supine  Prep: ChloraPrep  Provider prep: sterile gloves and mask  Patient monitoring: continuous pulse ox, cardiac monitor and IV access  Block type: Saphenous and iPacks  Laterality: left  Injection technique: single-shot  Guidance: ultrasound guided  Local infiltration: decadron and ropivacaine  Infiltration strength: 0.5 %  Local infiltration: decadron and ropivacaine  Dose: 30 mL    Needle   Needle type:  Other   Needle gauge: 21 G  Needle localization: ultrasound guidance  Needle length: 8 cmOther needle type: [place]  Assessment   Injection assessment: negative aspiration for heme, no paresthesia on injection, local visualized surrounding nerve on ultrasound, no intravascular symptoms and low pressure verified by pressure monitor  Paresthesia pain: none  Slow fractionated injection: yes  Hemodynamics: stable  Real-time US image taken/store: yes  Outcomes: patient tolerated procedure well and uncomplicated

## 2022-12-19 NOTE — BRIEF OP NOTE
Brief Postoperative Note      Patient: Rebeka Cantor  YOB: 1965  MRN: [id number]    Date of Procedure: 12/19/2022    Pre-Op Diagnosis: ARTHRITIS LEFT KNEE    Post-Op Diagnosis: Same       Procedure(s):  KNEE TOTAL ARTHROPLASTY    Surgeon(s):  Vanessa Travis MD    Assistant:  First Assistant: Chadd Chapa    Anesthesia: General    Estimated Blood Loss (mL): 578    Complications: None    Specimens:   * No specimens in log *    Implants:  Implant Name Type Inv. Item Serial No.  Lot No. LRB No. Used Action   CEMENT BNE 40GM HI VISC RADPQ FOR REV SURG - [id number]  CEMENT BNE 40GM HI VISC RADPQ FOR REV SURG  [employer]- [id number] Left 2 Implanted   COMPONENT PAT PHL11DN THK9MM STD KNEE VIVACIT-E FAVIOLA PERSONA - [id number]  COMPONENT PAT [id number] THK9MM STD KNEE VIVACIT-E FAVIOLA PERSONA  [employer]- [id number] Left 1 Implanted   PSN FEM CR CMT CCR STD SZ10 L - [id number]  PSN FEM CR CMT CCR STD SZ10 L  [employer]- [id number] Left 1 Implanted   DUP USE [id number] IMPL KNEE PSN TIB STM 5 DEG SZ G L - [id number] Knee DUP USE [id number] IMPL KNEE PSN TIB STM 5 DEG SZ G L  [employer] [id number] Left 1 Implanted   CEMENT BNE 40GM HI VISC RADPQ FOR REV SURG - [id number]  CEMENT BNE 40GM HI VISC RADPQ FOR REV SURG  [employer]- [id number] Left 1 Implanted   PSN MC VE ASF L 10MM 8-11 GH - [id number]  PSN MC VE ASF L 10MM 8-11 GH  [employer]- [id number] Left 1 Implanted         Drains: 2  Closed/Suction Drain Left; Anterior Knee (Active)       Findings:     Electronically signed by Venetia Oppenheim, MD on 12/19/2022 at 12:39 PM

## 2022-12-19 NOTE — PROGRESS NOTES
Patient transported to [facility] Room 302 via bed with 2 RNs. Bedside report given to Debbi Car and Aravind Barnes RNs, questions answered. Discharge Criteria    Inpatients must meet Criteria 1 through 7. All other patients are either YES or N/A. If a NO is chosen then Anesthesia or Surgeon must be notified. 1.  Minimum 30 minutes after last dose of sedative medication, minimum 120 minutes after last dose of reversal agent. Yes      2. Systolic BP stable within 20 mmHg for 30 minutes & systolic BP between 90 & 460 or within 10 mmHg of baseline. Yes      3. Pulse between 60 and 100 or within 10 bpm of baseline. Yes      4. Spontaneous respiratory rate >/= 10 per minute. Yes      5. SaO2 >/= 95 or  >/= baseline. Yes      6. Able to cough and swallow or return to baseline function. Yes      7. Alert and oriented or return to baseline mental status. Yes      8. Demonstrates controlled, coordinated movements, ambulates with steady gait, or return to baseline activity function. N/A      9. Minimal or no pain or nausea, or at a level tolerable and acceptable to patient. N/A      10. Takes and retains oral fluids as allowed. N/A      11. Procedural / perioperative site stable. Minimal or no bleeding. N/A          12. If GI endoscopy procedure, minimal or no abdominal distention or passing flatus. N/A      13. Written discharge instructions and emergency telephone number provided. N/A      14. Accompanied by a responsible adult.     N/A

## 2022-12-19 NOTE — PROGRESS NOTES
Physical Therapy  Facility/Department: [facility] MED SURG  Physical Therapy Initial Assessment    Name: Shanon Crouch  : 1965  MRN: [id number]  Date of Service: 2022    Discharge Recommendations:  Continue to assess pending progress          Patient Diagnosis(es): There were no encounter diagnoses. Past Medical History:  has a past medical history of Diabetes mellitus ([address] Hyperlipidemia, and Hypertension. Past Surgical History:  has a past surgical history that includes Rotator cuff repair (Right) and Colonoscopy (2014). Assessment   Body Structures, Functions, Activity Limitations Requiring Skilled Therapeutic Intervention: Decreased functional mobility ; Decreased ROM; Decreased high-level IADLs;Decreased strength; Increased pain  Assessment: Pt is a 61year old male that underwent a L TKA. Pt presents with pain, decreased L knee strength and ROM, and reduced ambulation tolerance. Pt will benefit from skilled PT in order to address these deficits.   Treatment Diagnosis: difficulty walking L TKA  Specific Instructions for Next Treatment: 2x/dily except weekends 1x.daily  Therapy Prognosis: Good  Decision Making: Low Complexity  Requires PT Follow-Up: Yes  Activity Tolerance  Activity Tolerance: Other (comment)  Activity Tolerance Comments: pt very drowsey and nauseous     Plan   Physcial Therapy Plan  General Plan: 2 times a day 7 days a week  Specific Instructions for Next Treatment: 2x/dily except weekends 1x.daily  Current Treatment Recommendations: Strengthening, ROM, Balance training, Functional mobility training, Transfer training, Endurance training, Gait training, Manual, Neuromuscular re-education, Pain management, Home exercise program, Safety education & training, Patient/Caregiver education & training, Therapeutic activities  Safety Devices  Type of Devices: Gait belt, Call light within reach, Chair alarm in place, Left in chair Restrictions  Restrictions/Precautions  Restrictions/Precautions: General Precautions, Fall Risk, Up as Tolerated     Subjective   General  Chart Reviewed: Yes  Family / Caregiver Present: Yes  Referring Practitioner: Dr. Todd Hernandez  Referral Date : 12/19/22  Diagnosis: L TKA  Subjective  Subjective: pt reports a 3/10 pain but states he is nauseous and light headed         Social/Functional History  Social/Functional History  Lives With: Spouse  Type of Home: House  Home Layout: One level  Home Access: Stairs to enter without rails  Entrance Stairs - Number of Steps: 2  Bathroom Shower/Tub: Walk-in shower  Bathroom Equipment: Built-in shower seat  Has the patient had two or more falls in the past year or any fall with injury in the past year?: No  ADL Assistance: Independent  Homemaking Assistance: Independent  Ambulation Assistance: Independent  Transfer Assistance: Independent  Active : Yes  Occupation: Full time employment  Type of Occupation: farmer  Vision/Hearing       Cognition   Orientation  Overall Orientation Status: Within Normal Limits     Objective   Heart Rate: 94  Heart Rate Source: Monitor  BP: (!) 153/99  BP Location: Right upper arm  BP Method: Automatic  Patient Position: Semi fowlers  MAP (Calculated): 117  Resp: 18  SpO2: 93 %  O2 Device: Nasal cannula        Gross Assessment  AROM: Generally decreased, functional (L knee approx (5)-80)  Strength: Generally decreased, functional (L knee ext 3+/5, flex 4-/5)                 Balance  Sitting: Intact  Standing: With support  Standing - Static: Fair  Transfer Training  Transfer Training: Yes  Overall Level of Assistance: Contact-guard assistance;Assist X1  Interventions: Safety awareness training;Verbal cues  Sit to Stand: Contact-guard assistance;Assist X1  Stand to Sit: Contact-guard assistance;Assist X1  Gait Training  Gait Training: Yes  Gait  Overall Level of Assistance: Contact-guard assistance;Minimum assistance;Assist X1  Interventions: Safety awareness training;Verbal cues  Gait Abnormalities: Antalgic  Distance (ft): 5 Feet  Assistive Device: Walker, rolling       AM-PAC Score     AM-PAC Inpatient Mobility without Stair Climbing Raw Score : 15 (12/19/22 1615)  AM-PAC Inpatient without Stair Climbing T-Scale Score : 43.03 (12/19/22 1615)  Mobility Inpatient CMS 0-100% Score: 47.43 (12/19/22 1615)  Mobility Inpatient without Stair CMS G-Code Modifier : CK (12/19/22 1615)    Goals  Short Term Goals  Time Frame for Short Term Goals: 20 days  Short Term Goal 1: Pt will be educated on his POC and HEP  Short Term Goal 2: Pt codi increase L knee AROM from 0-90 in order to normalize gait  Short Term Goal 3: Pt will complete all transfers Mod I in order to return home  Short Term Goal 4: Pt will increase L knee strength to >/=4/5 in order to increase ambualtion tolerance to >/=150 feet with FWW       Education  Patient Education  Education Given To: Patient  Education Provided: Role of Therapy;Plan of Care      Therapy Time   Individual Concurrent Group Co-treatment   Time In 1543         Time Out 1557         Minutes 14         Timed Code Treatment Minutes: 736 Claudio Salgado, PT

## 2022-12-19 NOTE — PROGRESS NOTES
Occupational Therapy  Facility/Department: [facility] AT THE [facility] MED SURG  Occupational Therapy Initial Assessment    Name: Franny Borja  : 1965  MRN: [id number]  Date of Service: 2022    Discharge Recommendations:  Continue to assess pending progress          Patient Diagnosis(es): There were no encounter diagnoses. Past Medical History:  has a past medical history of Diabetes mellitus ([address] Hyperlipidemia, and Hypertension. Past Surgical History:  has a past surgical history that includes Rotator cuff repair (Right) and Colonoscopy (). Treatment Diagnosis: Weakness      Assessment   Performance deficits / Impairments: Decreased functional mobility ; Decreased endurance;Decreased ADL status; Decreased balance;Decreased high-level IADLs;Decreased strength  Assessment: 62 y/o M admitted to T for elective L TKA resulting in decline in abilities to complete I/ADL at [facility]. Patient would benefit from OT services to address and educate on AE/DME for safe return home. Treatment Diagnosis: Weakness  Prognosis: Good  Decision Making: Medium Complexity  REQUIRES OT FOLLOW-UP: Yes        Plan   Occupational Therapy Plan  Times Per Day: Once a day  Days Per Week: 7 Days  Current Treatment Recommendations: Strengthening, Balance training, Functional mobility training, Patient/Caregiver education & training, Endurance training, Equipment evaluation, education, & procurement, Safety education & training, Self-Care / ADL, Home management training     Restrictions  Restrictions/Precautions  Restrictions/Precautions: General Precautions, Fall Risk, Up as Tolerated    Subjective   Subjective  Subjective: Patient sitting upright in bed upon arrival, states that he feels woozy but agreeable to OT evaluation. Nurse OK'd OT evaluation.   General Comment  Comments: Patient rates pain 5/10 L knee     Social/Functional History  Social/Functional History  Lives With: Spouse  Type of Home: House  Home Layout: One level  Home Access: Stairs to enter without rails  Entrance Stairs - Number of Steps: 2  Bathroom Shower/Tub: Walk-in shower  Bathroom Equipment: Built-in shower seat  Has the patient had two or more falls in the past year or any fall with injury in the past year?: No  ADL Assistance: Independent  Homemaking Assistance: Independent  Ambulation Assistance: Independent  Transfer Assistance: Independent  Active : Yes  Occupation: Full time employment  Type of Occupation: farmer       Objective   Heart Rate: (!) 106  Heart Rate Source: Monitor  BP: 131/88  BP Location: Right upper arm  BP Method: Automatic  Patient Position: Up in chair  MAP (Calculated): 102  Resp: 20  SpO2: 94 %  O2 Device: Nasal cannula             Safety Devices  Type of Devices: Gait belt;Call light within reach; Chair alarm in place; Left in chair; All fall risk precautions in place;Nurse notified  Balance  Sitting: Intact  Standing: With support  Standing - Static: Fair  Transfer Training  Transfer Training: Yes  Overall Level of Assistance: Contact-guard assistance;Assist X1  Interventions: Safety awareness training;Verbal cues  Sit to Stand: Contact-guard assistance;Assist X1  Stand to Sit: Contact-guard assistance;Assist X1  Gait Training  Gait Training: Yes  Gait  Overall Level of Assistance: Contact-guard assistance;Minimum assistance;Assist X1  Interventions: Safety awareness training;Verbal cues  Gait Abnormalities: Antalgic  Distance (ft): 5 Feet  Assistive Device: Walker, rolling     AROM: Within functional limits  PROM: Within functional limits  Strength: Generally decreased, functional  Coordination: Generally decreased, functional  Tone: Normal  Sensation: Intact  ADL  Feeding: Setup  Grooming: Stand by assistance  UE Bathing: Stand by assistance  LE Bathing:  Moderate assistance  UE Dressing: Stand by assistance  LE Dressing: Maximum assistance  Toileting: Contact guard assistance     Activity Tolerance  Activity Tolerance: Other (comment)  Activity Tolerance Comments: pt very drowsey and nauseous     Transfers  Stand Step Transfers: Contact guard assistance  Sit to stand: Contact guard assistance  Stand to sit: Contact guard assistance  Transfer Comments: Patient very \"woozy\" with transfers; nursing aware. Vision  Vision: Impaired  Vision Exceptions: Wears glasses at all times  Hearing  Hearing: Within functional limits  Cognition  Overall Cognitive Status: WFL  Orientation  Overall Orientation Status: Within Normal Limits                  Education Given To: Patient  Education Provided: Role of Therapy;Plan of Care;Transfer Training  Education Method: Verbal  Barriers to Learning: None  Education Outcome: Demonstrated understanding;Verbalized understanding                          AM-PAC Score        AM-PAC Inpatient Daily Activity Raw Score: 17 (12/19/22 1750)  AM-PAC Inpatient ADL T-Scale Score : 37.26 (12/19/22 1750)  ADL Inpatient CMS 0-100% Score: 50.11 (12/19/22 1750)  ADL Inpatient CMS G-Code Modifier : CK (12/19/22 1750)         Goals  Short Term Goals  Time Frame for Short Term Goals: 21 visits  Short Term Goal 1: Patient to be educated on d/c folder, AE/DME and home safety to ensure safe and indep return home. Short Term Goal 2: Patient to complete ADL routine c mod I c use of AE/DME as needed to ensure safe return home. Short Term Goal 3: Patient to engage in 15 minutes of ther ex/ther act to improve strength and act dejah for ADL and IADL. Short Term Goal 4: Patient to engage in 8  minutes of functional task of choice to improve standing tolerance, balance and safety during I/ADL.        Therapy Time   Individual Concurrent Group Co-treatment   Time In 1630         Time Out 1650         Minutes 20                 Leonora Acuna OTR/L

## 2022-12-19 NOTE — CONSULTS
Hospitalist Consult Note      Requesting Physician:  Dr. Todd Hernandez     Reason for consultation: Med Management     SUBJECTIVE:    History of Present Illness: The patient is a 61 y.o. male who underwent an elective left total Knee replacement by Dr. Todd Hernandez for degenerative arthritis and pain which was uncontrolled with outpatient conservative management. Post-op course thus far has been uncomplicated. His pain is well controlled post operatively. He denies nausea and vomiting post op. He denies any chest pain, palpitations or shortness of breath. Past Medical History:        Diagnosis Date    Diabetes mellitus ([address]     Hyperlipidemia     Hypertension        Past Surgical History:        Procedure Laterality Date    COLONOSCOPY  2014    ROTATOR CUFF REPAIR Right        Medications Prior to Admission:    Prior to Admission medications    Medication Sig Start Date End Date Taking? Authorizing Provider   glipiZIDE (GLUCOTROL) 10 MG tablet Take 10 mg by mouth 2 times daily 11/5/21  Yes Historical Provider, MD   hydroCHLOROthiazide (HYDRODIURIL) 25 MG tablet Take 25 mg by mouth daily    Historical Provider, MD   metoprolol tartrate (LOPRESSOR) 50 MG tablet Take 50 mg by mouth 2 times daily    Historical Provider, MD   simvastatin (ZOCOR) 20 MG tablet Take 20 mg by mouth daily    Historical Provider, MD   metFORMIN (GLUCOPHAGE) 500 MG tablet Take 500 mg by mouth 2 times daily (with meals)    Historical Provider, MD   Acetaminophen (TYLENOL EXTRA STRENGTH PO) Take by mouth as needed    Historical Provider, MD       Allergies:  Patient has no known allergies. Social History:   TOBACCO:   reports that he has never smoked. He has never used smokeless tobacco.  ETOH:   reports current alcohol use.     Family History:       Problem Relation Age of Onset    Heart Disease Mother     Asthma Mother     High Blood Pressure Mother     Diabetes Father     Heart Attack Father     Prostate Cancer Father        OBJECTIVE: Vitals:  Temp: 98.3 °F (36.8 °C)  BP: (!) 153/99  Resp: 18  Heart Rate: 94  SpO2: 93 %  24HR INTAKE/OUTPUT:    Intake/Output Summary (Last 24 hours) at 12/19/2022 1554  Last data filed at 12/19/2022 1525  Gross per 24 hour   Intake 1250 ml   Output 800 ml   Net 450 ml     -----------------------------------------------------------------    Review of Systems:  Constitutional:negative  for fevers, and negative for chills. Eyes: negative for visual disturbance   ENT: negative for sore throat, negative nasal congestion, and negative for earache  Respiratory: negative for shortness of breath, negative for cough, and negative for wheezing  Cardiovascular: negative for chest pain, negative for palpitations, and negative for syncope  Gastrointestinal: negative for abdominal pain, positive for nausea,negative for vomiting, negative for diarrhea, negative for constipation, and negative for hematochezia or melena  Genitourinary: negative for dysuria, negative for urinary urgency, negative for urinary frequency, and negative for hematuria  Skin: negative for skin rash, and negative for skin lesions  Neurological: negative for unilateral weakness, numbness or tingling. Exam:  GEN:    Awake, alert and oriented x 3. no acute distress  NECK:  Supple. normal  CVS:    RRR, no murmur, rub or gallop  PULM:  CTA, no wheezes, rales or rhonchi  ABD:    Bowels sounds normal.  Abdomen is soft. No distention. No tenderness. EXT:   no  edema. Pedal pulses are intact. No calf tenderness  NEURO: Motor and sensory are intact  SKIN:  Incision is clean, dry and intact.   No surrounding erythema.  -----------------------------------------------------------------  Diagnostic Data:    Lab Results   Component Value Date    WBC 6.7 12/02/2022    HGB 15.3 12/02/2022    MCV 90.3 12/02/2022     12/02/2022      Lab Results   Component Value Date    GLUCOSE 146 (H) 12/02/2022    BUN 17 12/02/2022    CREATININE 1.01 12/02/2022     12/02/2022    K 3.8 12/02/2022    CALCIUM 9.8 12/02/2022     12/02/2022    CO2 28 12/02/2022         ASSESSMENT:      Post-op medical management of:        Principal Problem:    S/P total knee replacement using cement, left  Active Problems:    Hypertension    Diabetes mellitus ([facility]  Resolved Problems:    * No resolved hospital problems.  *    s/p left total Knee arthroplasty    PLAN:  S/p Left Total Knee  Continue current physical and occupational therapy  Continue DVT prophylaxis-Lovenox  HTN  Continue Lopressor, HCTZ  DM  Continue Glucotrol, Glucophage  POCT AC/HS  ISS  Hypoglycemia Protocol  Discharge planning - Home with Outpatient PT      Nadya Cici, APRN - CNP, APRN, NP-C  12/19/2022, 3:54 PM

## 2022-12-19 NOTE — ANESTHESIA POSTPROCEDURE EVALUATION
Department of Anesthesiology  Postprocedure Note    Patient: Shanon Crouch  MRN: [id number]  YOB: 1965  Date of evaluation: 12/19/2022      Procedure Summary     Date: 12/19/22 Room / Location: [address] [place] [address]    Anesthesia Start: 0915 Anesthesia Stop: 0568    Procedure: KNEE TOTAL ARTHROPLASTY (Left: Knee) Diagnosis:       Osteoarthritis of left knee, unspecified osteoarthritis type      (ARTHRITIS LEFT KNEE)    Surgeons: Luther Key MD Responsible Provider: ARNULFO Mckeon CRNA    Anesthesia Type: spinal, general ASA Status: 3          Anesthesia Type: No value filed.     Clarence Phase I: Clarence Score: 9    Clarence Phase II:        Anesthesia Post Evaluation    Patient location during evaluation: PACU  Patient participation: complete - patient participated  Level of consciousness: awake and alert  Pain score: 0  Airway patency: patent  Nausea & Vomiting: no nausea and no vomiting  Complications: no  Cardiovascular status: hemodynamically stable  Respiratory status: acceptable  Hydration status: euvolemic  Multimodal analgesia pain management approach

## 2022-12-20 VITALS
WEIGHT: 288.8 LBS | HEIGHT: 69 IN | HEART RATE: 76 BPM | OXYGEN SATURATION: 94 % | SYSTOLIC BLOOD PRESSURE: 134 MMHG | BODY MASS INDEX: 42.78 KG/M2 | DIASTOLIC BLOOD PRESSURE: 82 MMHG | TEMPERATURE: 98.9 F | RESPIRATION RATE: 18 BRPM

## 2022-12-20 LAB
ESTIMATED AVERAGE GLUCOSE: 126 MG/DL
GLUCOSE BLD-MCNC: 150 MG/DL (ref 74–100)
GLUCOSE BLD-MCNC: 198 MG/DL (ref 74–100)
HBA1C MFR BLD: 6 % (ref 4–6)
HCT VFR BLD CALC: 39.1 % (ref 40.7–50.3)
HEMOGLOBIN: 13 G/DL (ref 13–17)

## 2022-12-20 PROCEDURE — 2580000003 HC RX 258: Performed by: ORTHOPAEDIC SURGERY

## 2022-12-20 PROCEDURE — G0378 HOSPITAL OBSERVATION PER HR: HCPCS

## 2022-12-20 PROCEDURE — 2500000003 HC RX 250 WO HCPCS: Performed by: ORTHOPAEDIC SURGERY

## 2022-12-20 PROCEDURE — 6370000000 HC RX 637 (ALT 250 FOR IP): Performed by: NURSE PRACTITIONER

## 2022-12-20 PROCEDURE — 85014 HEMATOCRIT: CPT

## 2022-12-20 PROCEDURE — 82947 ASSAY GLUCOSE BLOOD QUANT: CPT

## 2022-12-20 PROCEDURE — 97535 SELF CARE MNGMENT TRAINING: CPT

## 2022-12-20 PROCEDURE — 94761 N-INVAS EAR/PLS OXIMETRY MLT: CPT

## 2022-12-20 PROCEDURE — 6360000002 HC RX W HCPCS: Performed by: ORTHOPAEDIC SURGERY

## 2022-12-20 PROCEDURE — 97116 GAIT TRAINING THERAPY: CPT

## 2022-12-20 PROCEDURE — 97110 THERAPEUTIC EXERCISES: CPT

## 2022-12-20 PROCEDURE — 85018 HEMOGLOBIN: CPT

## 2022-12-20 PROCEDURE — 6370000000 HC RX 637 (ALT 250 FOR IP): Performed by: ORTHOPAEDIC SURGERY

## 2022-12-20 PROCEDURE — 96372 THER/PROPH/DIAG INJ SC/IM: CPT

## 2022-12-20 PROCEDURE — 36415 COLL VENOUS BLD VENIPUNCTURE: CPT

## 2022-12-20 RX ORDER — RIVAROXABAN 10 MG/1
10 TABLET, FILM COATED ORAL
Qty: 12 TABLET | Refills: 0 | Status: SHIPPED | OUTPATIENT
Start: 2022-12-20

## 2022-12-20 RX ORDER — HYDROCODONE BITARTRATE AND ACETAMINOPHEN 5; 325 MG/1; MG/1
1-2 TABLET ORAL EVERY 4 HOURS PRN
Qty: 30 TABLET | Refills: 0 | Status: SHIPPED | OUTPATIENT
Start: 2022-12-20 | End: 2022-12-23

## 2022-12-20 RX ORDER — TRANEXAMIC ACID 10 MG/ML
1000 INJECTION, SOLUTION INTRAVENOUS ONCE
Status: DISCONTINUED | OUTPATIENT
Start: 2022-12-20 | End: 2022-12-20

## 2022-12-20 RX ADMIN — METFORMIN HYDROCHLORIDE 500 MG: 500 TABLET ORAL at 08:13

## 2022-12-20 RX ADMIN — TRANEXAMIC ACID 1300 MG: 650 TABLET ORAL at 08:17

## 2022-12-20 RX ADMIN — ACETAMINOPHEN 650 MG: 325 TABLET ORAL at 08:13

## 2022-12-20 RX ADMIN — TRANEXAMIC ACID 1000 MG: 1 INJECTION, SOLUTION INTRAVENOUS at 11:35

## 2022-12-20 RX ADMIN — ACETAMINOPHEN 650 MG: 325 TABLET ORAL at 04:00

## 2022-12-20 RX ADMIN — ATORVASTATIN CALCIUM 10 MG: 10 TABLET, FILM COATED ORAL at 08:12

## 2022-12-20 RX ADMIN — ACETAMINOPHEN 650 MG: 325 TABLET ORAL at 14:09

## 2022-12-20 RX ADMIN — GLIPIZIDE 10 MG: 5 TABLET ORAL at 08:12

## 2022-12-20 RX ADMIN — SODIUM CHLORIDE, PRESERVATIVE FREE 10 ML: 5 INJECTION INTRAVENOUS at 08:13

## 2022-12-20 RX ADMIN — ENOXAPARIN SODIUM 30 MG: 100 INJECTION SUBCUTANEOUS at 08:13

## 2022-12-20 RX ADMIN — HYDROCHLOROTHIAZIDE 25 MG: 25 TABLET ORAL at 08:12

## 2022-12-20 RX ADMIN — METOPROLOL TARTRATE 50 MG: 50 TABLET, FILM COATED ORAL at 08:12

## 2022-12-20 ASSESSMENT — PAIN DESCRIPTION - ORIENTATION: ORIENTATION: LEFT

## 2022-12-20 ASSESSMENT — PAIN DESCRIPTION - DESCRIPTORS: DESCRIPTORS: ACHING;DISCOMFORT

## 2022-12-20 ASSESSMENT — PAIN DESCRIPTION - LOCATION: LOCATION: INCISION;KNEE

## 2022-12-20 ASSESSMENT — PAIN SCALES - GENERAL
PAINLEVEL_OUTOF10: 5
PAINLEVEL_OUTOF10: 3
PAINLEVEL_OUTOF10: 0

## 2022-12-20 ASSESSMENT — PAIN - FUNCTIONAL ASSESSMENT: PAIN_FUNCTIONAL_ASSESSMENT: ACTIVITIES ARE NOT PREVENTED

## 2022-12-20 NOTE — PROGRESS NOTES
Patient arrived to floor via bed at this time. Report received from surgery RN, Johnny Ya. Will continue to monitor.

## 2022-12-20 NOTE — DISCHARGE INSTR - DIET
Good nutrition is important when healing from an illness, injury, or surgery. Follow any nutrition recommendations given to you during your hospital stay. If you were given an oral nutrition supplement while in the hospital, continue to take this supplement at home. You can take it with meals, in-between meals, and/or before bedtime. These supplements can be purchased at most local grocery stores, pharmacies, and chain [employer]-stores. If you have any questions about your diet or nutrition, call the hospital and ask for the dietitian.              General diet

## 2022-12-20 NOTE — PROGRESS NOTES
Progress Note    Subjective:     Post-Operative Day: 1 Status Post left Total Knee Arthroplasty  Systemic or Specific Complaints:No Complaints    Objective:     Patient Vitals for the past 24 hrs:   BP Temp Temp src Pulse Resp SpO2 Height Weight   12/20/22 0938 -- -- -- -- -- -- 5' 9\" (1.753 m) --   12/20/22 0716 134/82 98.9 °F (37.2 °C) Temporal 76 18 94 % -- --   12/20/22 0409 121/79 97.3 °F (36.3 °C) Temporal 78 18 93 % -- 288 lb 12.8 oz (131 kg)   12/20/22 0103 113/78 97.4 °F (36.3 °C) Temporal 79 20 96 % -- --   12/19/22 2000 139/89 98.5 °F (36.9 °C) Temporal (!) 105 20 94 % -- --   12/19/22 1845 (!) 131/91 98.3 °F (36.8 °C) Temporal (!) 105 20 93 % -- --   12/19/22 1700 131/88 98.3 °F (36.8 °C) Temporal (!) 106 20 94 % -- --   12/19/22 1600 134/84 98.4 °F (36.9 °C) Temporal 88 20 93 % -- --   12/19/22 1506 -- -- -- -- 18 -- -- --   12/19/22 1500 (!) 153/99 98.3 °F (36.8 °C) Temporal 94 20 93 % -- --   12/19/22 1430 (!) 149/99 98.4 °F (36.9 °C) Temporal 82 20 92 % -- --   12/19/22 1403 (!) 132/91 98.5 °F (36.9 °C) Oral 86 18 92 % -- --   12/19/22 1345 135/86 -- -- 83 20 94 % -- --   12/19/22 1330 138/86 -- -- 80 20 93 % -- --   12/19/22 1315 130/85 -- -- 82 16 92 % -- --   12/19/22 1310 132/89 -- -- 84 18 93 % -- --   12/19/22 1305 (!) 148/93 -- -- 86 17 92 % -- --   12/19/22 1302 -- -- -- -- -- 92 % -- --   12/19/22 1300 (!) 138/103 97.5 °F (36.4 °C) Temporal 90 16 (!) 89 % -- --       General: alert, appears stated age, and cooperative   Wound: Wound clean and dry no evidence of infection. Motion: Painless Range of Motion   DVT Exam: No evidence of DVT seen on physical exam.       Knee swollen but thigh soft to palpation. Moving foot and ankle. Good distal pulses.       Data Review  CBC:   Lab Results   Component Value Date/Time    WBC 6.7 12/02/2022 01:52 PM    RBC 5.13 12/02/2022 01:52 PM    HGB 13.0 12/20/2022 05:40 AM    HCT 39.1 12/20/2022 05:40 AM     12/02/2022 01:52 PM       Assessment: Status Post left Total Knee Arthroplasty. Doing well postoperatively.      Plan:      1: Discharge today, Return to Clinic: 2 weeks :  2:  Continue Deep venous thrombosis prophylaxis  3:  Continue physical therapy  4:  Continue Pain Control

## 2022-12-20 NOTE — DISCHARGE INSTR - ACTIVITY
As tolerated using a walker  No driving until approved by Dr Sherrell Jj dressing intact until seen by Dr Daya Campbell for a follow up   Ice to left knee as needed for pain and swelling. May shower in 3 days with CHG soap, Friday. Keep dressing intact, pat dry.

## 2022-12-20 NOTE — PROGRESS NOTES
Occupational Therapy  Facility/Department: [facility] AT THE [facility] MED SURG  Daily Treatment Note  NAME: Franny Borja  : 1965  MRN: [id number]    Date of Service: 2022    Discharge Recommendations:  Continue to assess pending progress         Patient Diagnosis(es): The encounter diagnosis was S/P total knee replacement using cement, left. Assessment    Activity Tolerance: Patient tolerated treatment well  Discharge Recommendations: Continue to assess pending progress  Factors Affecting Discharge: Pt educated on d/c folder, AE/DME, and safety with G understanding. Plan   Occupational Therapy Plan  Times Per Day: Once a day  Days Per Week: 7 Days  Current Treatment Recommendations: Strengthening;Balance training;Functional mobility training;Patient/Caregiver education & training; Endurance training;Equipment evaluation, education, & procurement; Safety education & training;Self-Care / ADL; Home management training     Restrictions  Restrictions/Precautions  Restrictions/Precautions: General Precautions; Fall Risk;Up as Tolerated    Subjective   Subjective  Subjective: Pt sitting up in bedside chair upon arrival. Pt agreed to participate in therapy session this date. Pain: Pt reported 2/10 pain in L knee this date.   Orientation  Overall Orientation Status: Within Normal Limits  Pain: stiffness           Objective    Vitals     Bed Mobility Training  Bed Mobility Training: No  Overall Level of Assistance: Stand-by assistance;Assist X1  Interventions: Verbal cues  Rolling: Stand-by assistance;Assist X1  Supine to Sit: Stand-by assistance;Assist X1  Scooting: Stand-by assistance;Assist X1  Transfer Training  Transfer Training: Yes  Overall Level of Assistance: Stand-by assistance;Contact-guard assistance;Assist X1  Interventions: Verbal cues  Sit to Stand: Stand-by assistance;Contact-guard assistance;Assist X1  Stand to Sit: Stand-by assistance;Contact-guard assistance;Assist X1  Toilet Transfer: Stand-by assistance;Contact-guard assistance;Assist X1  Gait Training  Gait Training: Yes  Gait  Overall Level of Assistance: Contact-guard assistance;Assist X1  Interventions: Safety awareness training;Verbal cues  Assistive Device: Walker, rolling     ADL  LE Dressing: Stand by assistance  LE Dressing Skilled Clinical Factors: Pt educated on LB dressing AE of LH reacher, sock aid, and Easy slide with G understanding. Pt declined return demo. Toileting: Stand by assistance        Safety Devices  Type of Devices: Call light within reach; Left in chair;Chair alarm in place     Patient Education  Education Given To: Patient  Education Provided: Role of Therapy;Plan of Care;Transfer Training  Education Method: Verbal  Barriers to Learning: None  Education Outcome: Demonstrated understanding;Verbalized understanding    Goals  Short Term Goals  Time Frame for Short Term Goals: 21 visits  Short Term Goal 1: Patient to be educated on d/c folder, AE/DME and home safety to ensure safe and indep return home. Short Term Goal 2: Patient to complete ADL routine c mod I c use of AE/DME as needed to ensure safe return home. Short Term Goal 3: Patient to engage in 15 minutes of ther ex/ther act to improve strength and act dejah for ADL and IADL. Short Term Goal 4: Patient to engage in 8  minutes of functional task of choice to improve standing tolerance, balance and safety during I/ADL.        Therapy Time   Individual Concurrent Group Co-treatment   Time In 1105         Time Out 1130         Minutes 25                 MANUEL Bowman/COLEEN

## 2022-12-20 NOTE — PROGRESS NOTES
Physical Therapy  Facility/Department: [facility] AT THE [facility] MED SURG  Daily Treatment Note  NAME: Lewis Reynoso  : 1965  MRN: [id number]    Date of Service: 2022    Discharge Recommendations:  Continue to assess pending progress      Patient Diagnosis(es): The encounter diagnosis was S/P total knee replacement using cement, left. Assessment   Assessment: Pt stated he is going to be d/c shortly. Gait 2x 150' with [address] CGA/SBA x1. 4 steps completed with WW to mimic home set up,  2 up/down. Transfers SBA x1. Activity Tolerance: Patient tolerated treatment well     Plan    Physcial Therapy Plan  General Plan: 2 times a day 7 days a week (1x/day on weekends and holidays)  Specific Instructions for Next Treatment: 2x/dily except weekends 1x.daily  Current Treatment Recommendations: Strengthening;ROM;Balance training;Functional mobility training;Transfer training; Endurance training;Gait training;Manual;Neuromuscular re-education;Pain management;Home exercise program;Safety education & training;Patient/Caregiver education & training; Therapeutic activities     Restrictions  Restrictions/Precautions  Restrictions/Precautions: General Precautions, Fall Risk, Up as Tolerated     Subjective    Subjective  Subjective: Pt in recliner upon arrival and agreeable to thearapy.   Pain: 5/10 L knee pain/stiffness  Orientation  Overall Orientation Status: Within Normal Limits     Objective   Bed Mobility Training  Bed Mobility Training: No  Overall Level of Assistance: Stand-by assistance;Assist X1  Interventions: Verbal cues  Rolling: Stand-by assistance;Assist X1  Supine to Sit: Stand-by assistance;Assist X1  Scooting: Stand-by assistance;Assist X1  Transfer Training  Transfer Training: Yes  Overall Level of Assistance: Stand-by assistance;Contact-guard assistance;Assist X1  Interventions: Verbal cues  Sit to Stand: Stand-by assistance;Contact-guard assistance;Assist X1  Stand to Sit: Stand-by assistance;Contact-guard assistance;Assist X1  Toilet Transfer: Stand-by assistance;Contact-guard assistance;Assist X1  Gait Training  Gait Training: Yes  Gait  Overall Level of Assistance: Contact-guard assistance;Assist X1  Interventions: Safety awareness training;Verbal cues  Speed/Malinda: Slow  Step Length: Left shortened;Right shortened  Gait Abnormalities: Antalgic  Distance (ft):  (643fun4,20ftx1)  Assistive Device: Walker, rolling  Neuromuscular Education  Neuromuscular Education: No  PT Exercises  Exercise Treatment: Supine and seated exercises B LE x10  Other Specialty Interventions  Other Treatments/Modalities: standing commode use  Safety Devices  Type of Devices: Call light within reach; Left in chair;Nurse notified;Gait belt       Goals  Short Term Goals  Time Frame for Short Term Goals: 20 days  Short Term Goal 1: Pt will be educated on his POC and HEP  Short Term Goal 2: Pt codi increase L knee AROM from 0-90 in order to normalize gait  Short Term Goal 3: Pt will complete all transfers Mod I in order to return home  Short Term Goal 4: Pt will increase L knee strength to >/=4/5 in order to increase ambualtion tolerance to >/=150 feet with FWW    Education  Patient Education  Education Given To: Patient  Education Provided Comments: Educated patient in safety with [employer] during ambulation up/down stairs.   Education Method: Verbal  Barriers to Learning: None  Education Outcome: Verbalized understanding;Demonstrated understanding    Therapy Time   Individual Concurrent Group Co-treatment   Time In 1356         Time Out 1410         Minutes [address] [place], [place]

## 2022-12-20 NOTE — PLAN OF CARE
Problem: Chronic Conditions and Co-morbidities  Goal: Patient's chronic conditions and co-morbidity symptoms are monitored and maintained or improved  Outcome: Completed     Problem: Discharge Planning  Goal: Discharge to home or other facility with appropriate resources  Outcome: Completed     Problem: Pain  Goal: Verbalizes/displays adequate comfort level or baseline comfort level  Outcome: Completed  Flowsheets  Taken 12/20/2022 0409 by Mago Valadez RN  Verbalizes/displays adequate comfort level or baseline comfort level: Encourage patient to monitor pain and request assistance  Taken 12/20/2022 0103 by Mago Valadez RN  Verbalizes/displays adequate comfort level or baseline comfort level: Encourage patient to monitor pain and request assistance     Problem: Safety - Adult  Goal: Free from fall injury  Outcome: Completed     Problem: ABCDS Injury Assessment  Goal: Absence of physical injury  Outcome: Completed     Problem: Nutrition Deficit:  Goal: Optimize nutritional status  12/20/2022 1209 by Luz Marina Cash RN  Outcome: Completed  12/20/2022 0945 by Edgar Solis RD, LD  Outcome: Progressing  Flowsheets (Taken 12/20/2022 0945)  Nutrient intake appropriate for improving, restoring, or maintaining nutritional needs: Monitor oral intake, labs, and treatment plans

## 2022-12-20 NOTE — FLOWSHEET NOTE
Awake, resting in bed watching TV. Vitals checked and assessment completed . Denies knee pain this morning. No nausea. Circulation check to left leg WNL. Dressing clean dry and intact. No needs at present time.  Continue to monitor

## 2022-12-20 NOTE — FLOWSHEET NOTE
Awake, resting in bed. Vital checked and assessment completed. Denies left knee pain this morning. Circulation checks WNL. No needs at present time.  Continue to monitor

## 2022-12-20 NOTE — PROGRESS NOTES
Nutrition Assessment     Type and Reason for Visit: Initial    Nutrition Recommendations/Plan:   Encourage lean proteins  Encourage oral fluids     Malnutrition Assessment:  Malnutrition Status: No malnutrition    Nutrition Assessment:  Increased nutrient needs r/t acute injury or trauma, AEB post op knee. Known diabetes with good control, \"watching\" diet at home. Good appetite and intakes noted. Acute glucose excursions with surgical stress, improving in post op phase. Denies nutrition questions or concerns. Hopes to d/c home this afternoon. Nutrition Related Findings:   knee drain, obesity, BLE non pitting edema. Wound Type: Surgical Incision    Current Nutrition Therapies:    ADULT DIET;  Regular    Anthropometric Measures:  Height: 5' 9\" (175.3 cm)  Current Body Wt: 288 lb 12.8 oz (131 kg)   BMI: 42.6    Nutrition Diagnosis:   Increased nutrient needs related to acute injury/trauma as evidenced by wounds    Lab Results   Component Value Date     12/02/2022    K 3.8 12/02/2022     12/02/2022    CO2 28 12/02/2022    BUN 17 12/02/2022    CREATININE 1.01 12/02/2022    GLUCOSE 146 (H) 12/02/2022    CALCIUM 9.8 12/02/2022    LABGLOM >60 12/02/2022     Hemoglobin A1C   Date Value Ref Range Status   12/02/2022 5.8 4.0 - 6.0 % Final     No results found for: VITD25    Nutrition Interventions:   Food and/or Nutrient Delivery: Continue Current Diet  Nutrition Education/Counseling: Education initiated  Coordination of Nutrition Care: Continue to monitor while inpatient  Plan of Care discussed with: patient    Goals:     Goals: Meet at least 75% of estimated needs       Nutrition Monitoring and Evaluation:   Behavioral-Environmental Outcomes: None Identified  Food/Nutrient Intake Outcomes: Food and Nutrient Intake  Physical Signs/Symptoms Outcomes: Biochemical Data, Weight, Fluid Status or Edema    Discharge Planning:    No discharge needs at this time     [facility], [address], [address] [place] [address]: [id number]

## 2022-12-20 NOTE — PROGRESS NOTES
Patient resting comfortably at this time. Patient denies any pain and any other needs at this time. Patient alert & oriented x4 and able to answer all questions appropriately and follow commands. Patient states full sensation in LLE and denies any numbness or tingling. Assessment and vitals as charted. chair alarm on, chair locked, gripper socks on, call light within reach and able to use appropriately. Will continue to monitor this shift.

## 2022-12-20 NOTE — PROGRESS NOTES
Cresencio Visalia, APRN-CNP -- Hospitalist  Progress Note 12/20/22    SUBJECTIVE:    Patient seen for f/u of post op medical management of HTN, DM. He is POD # 1 s/p left TKR. His pain is well controlled. He denies nausea and vomiting. Last BM was prior to surgery. He denies any chest pain, palpitations or shortness of breath. He is doing well with PT    ROS:   Constitutional: negative  for fevers, and negative for chills. Respiratory: negative for shortness of breath, negative for cough, and negative for wheezing  Cardiovascular: negative for chest pain, and negative for palpitations  Gastrointestinal: negative for abdominal pain, negative for nausea,negative for vomiting, negative for diarrhea, and negative for constipation    OBJECTIVE:    Vitals:   Temp: 98.9 °F (37.2 °C)  BP: 134/82  Resp: 18  Heart Rate: 76  SpO2: 94 %    24HR INTAKE/OUTPUT:    Intake/Output Summary (Last 24 hours) at 12/20/2022 0856  Last data filed at 12/20/2022 0408  Gross per 24 hour   Intake 3798 ml   Output 2015 ml   Net 1783 ml      -----------------------------------------------------------------    Exam:  GEN:    Awake, alert and oriented x3. EYES:  EOMI, pupils equal   NECK: Supple. No lymphadenopathy. No carotid bruit  CVS:    regular rate and rhythm, no audible murmur  PULM:  CTA, no wheezes, rales or rhonchi, no acute respiratory distress  ABD:    Bowels sounds normal.  Abdomen is soft. No distention. no tenderness to palpation. EXT:   no edema bilaterally . No calf tenderness. NEURO: Moves all extremities. Motor and sensory are grossly intact   SKIN:  Incision is dressed with minimal drainage.      Diagnostic Data:  Lab Results   Component Value Date    HGB 13.0 12/20/2022      Lab Results   Component Value Date    GLUCOSE 146 (H) 12/02/2022    BUN 17 12/02/2022    CREATININE 1.01 12/02/2022     12/02/2022    K 3.8 12/02/2022    CALCIUM 9.8 12/02/2022     12/02/2022    CO2 28 12/02/2022       PROBLEM LIST:  Principal Problem:    S/P total knee replacement using cement, left  Active Problems:    Hypertension    Diabetes mellitus ([facility] [address]  Resolved Problems:    * No resolved hospital problems.  *      ASSESSMENT / PLAN:    Post op medical management for HTN, DM  Post op day # 1 s/p left TKR  Continue current physical and occupational therapy  Continue DVT prophylaxis-Lovenox  Acute post op blood loss anemia  Monitor H/H-13.0  HTN  Continue Lopressor, HCTZ  DM  Continue Glucotrol, Glucophage  POCT AC/HS  ISS  Hypoglycemia Protocol  Discharge plan is home with outpatient PT    Felicity Singh, ARNULFO - CNP , ARNULFO-CNP  12/20/2022  8:56 AM

## 2022-12-20 NOTE — DISCHARGE SUMMARY
Physician Discharge Summary     Patient ID:  Mary Kay Livers  [id number]  1965    Admit date: 12/19/2022    Discharge date and time:      Admitting Physician: Merary Linder MD     Primary Care Physician: Lucrecia Alamguer MD    Primary Discharge Diagnoses: DJD left knee     Additional Diagnoses:       Diagnosis Date    Diabetes mellitus [facility])     Hyperlipidemia     Hypertension        Hospital Course: The patient was admitted for the above. He was treated with surgery and improved over the course of his hospitalization. Consults: none    Procedures: left knee total replacement    Complications: none    Discharge Condition: good    Post op anemia:  acute blood loss    Lab Results   Component Value Date/Time    HGB 13.0 12/20/2022 05:40 AM    HGB 15.3 12/02/2022 01:52 PM       Estimate Blood Loss:       BMI: Body mass index is 42.65 kg/m². morbid obesity     Disposition: Home    Patient Instructions:      Medication List        START taking these medications      HYDROcodone-acetaminophen 5-325 MG per tablet  Commonly known as: Norco  Take 1-2 tablets by mouth every 4 hours as needed for Pain for up to 3 days.      Xarelto 10 MG Tabs tablet  Generic drug: rivaroxaban  Take 1 tablet by mouth daily (with breakfast)            CONTINUE taking these medications      glipiZIDE 10 MG tablet  Commonly known as: GLUCOTROL     hydroCHLOROthiazide 25 MG tablet  Commonly known as: HYDRODIURIL     metFORMIN 500 MG tablet  Commonly known as: GLUCOPHAGE     metoprolol tartrate 50 MG tablet  Commonly known as: LOPRESSOR     simvastatin 20 MG tablet  Commonly known as: ZOCOR     TYLENOL EXTRA STRENGTH PO               Where to Get Your Medications        [phone]  [address] [place] [id number]      Phone: [phone]   HYDROcodone-acetaminophen 5-325 MG per tablet  Xarelto 10 MG Tabs tablet       Activity: Physical Therapy  Wound Care: keep wound clean and dry, shower after 4 days if no drainage  Other:      Follow-up with Lester Ferguson MD in 2 weeks.     Signed:  Lester Ferguson MD, M.D.  12/20/2022  11:25 AM

## 2022-12-20 NOTE — PROGRESS NOTES
Canthacur Duration Text (Please Remove Duration From Postcare): The patient was instructed to leave the Canthacur on for 6-8 hours and then wash the area well with soap and water. Physical Therapy  Facility/Department: [facility] MED SURG  Daily Treatment Note  NAME: Amna Bain  : 1965  MRN: [id number]  Date of Service: 2022  Discharge Recommendations:  Continue to assess pending progress   Patient Diagnosis(es): There were no encounter diagnoses. Assessment   Assessment: Gait with WW 971ksq1,20ftx1, CGA. Bed mobility/Transfers:SBA/CGA. Supine and seated exercises L LE x10. Activity Tolerance: Patient tolerated treatment well   Plan    Physcial Therapy Plan  General Plan: 2 times a day 7 days a week  Specific Instructions for Next Treatment: 2x/dily except weekends 1x.daily  Current Treatment Recommendations: Strengthening;ROM;Balance training;Functional mobility training;Transfer training; Endurance training;Gait training;Manual;Neuromuscular re-education;Pain management;Home exercise program;Safety education & training;Patient/Caregiver education & training; Therapeutic activities   Restrictions  Restrictions/Precautions  Restrictions/Precautions: General Precautions, Fall Risk, Up as Tolerated   Subjective    Subjective  Subjective: Pt. in bed upon arrival, agreeable to therapy at this time.   Pain: stiffness  Orientation  Overall Orientation Status: Within Normal Limits   Objective   Bed Mobility Training  Bed Mobility Training: Yes  Overall Level of Assistance: Stand-by assistance;Assist X1  Interventions: Verbal cues  Rolling: Stand-by assistance;Assist X1  Supine to Sit: Stand-by assistance;Assist X1  Scooting: Stand-by assistance;Assist X1  Transfer Training  Transfer Training: Yes  Overall Level of Assistance: Stand-by assistance;Contact-guard assistance;Assist X1  Interventions: Verbal cues  Sit to Stand: Stand-by assistance;Contact-guard assistance;Assist X1  Stand to Sit: Stand-by assistance;Contact-guard assistance;Assist X1  Toilet Transfer: Stand-by assistance;Contact-guard assistance;Assist X1  Gait Training  Gait Training: Yes  Gait  Overall Level of Assistance: Canthacur Ps Duration Text (Please Remove Duration From Postcare): The patient was instructed to leave the Canthacur PS on for 6-8 hours and then wash the area well with soap and water. Contact-guard assistance;Assist X1  Interventions: Safety awareness training;Verbal cues  Speed/Malinda: Slow  Step Length: Left shortened;Right shortened  Gait Abnormalities: Antalgic  Distance (ft):  (581laq0,20ftx1)  Assistive Device: Walker, rolling  PT Exercises  Exercise Treatment: Supine and seated exercises B LE x10  Other Specialty Interventions  Other Treatments/Modalities: standing commode use  Safety Devices  Type of Devices: Gait belt;Call light within reach; Chair alarm in place; Left in chair; All fall risk precautions in place;Nurse notified   Goals  Short Term Goals  Time Frame for Short Term Goals: 20 days  Short Term Goal 1: Pt will be educated on his POC and HEP  Short Term Goal 2: Pt codi increase L knee AROM from 0-90 in order to normalize gait  Short Term Goal 3: Pt will complete all transfers Mod I in order to return home  Short Term Goal 4: Pt will increase L knee strength to >/=4/5 in order to increase ambualtion tolerance to >/=150 feet with FWW  Education  Patient Education  Education Given To: Patient  Education Provided: Role of Therapy;Plan of Care;Home Exercise Program  Education Provided Comments: Reviewed HEP  Education Method: Verbal  Barriers to Learning: None  Education Outcome: Verbalized understanding  Therapy Time   Individual Concurrent Group Co-treatment   Time In 0738         Time Out 0805         Minutes [address], PTA Curette Text: Wash off in 6-8 hours Medical Necessity Information: It is in your best interest to select a reason for this procedure from the list below. All of these items fulfill various CMS LCD requirements except the new and changing color options. Post-Care Instructions: Wash off in 6-8 hours. Cantharone Plus Duration Text (Please Remove Duration From Postcare): The patient was instructed to leave the Cantharone Plus on for 6-8 hours and then wash the area well with soap and water. Strength: Canthacur Medical Necessity Clause: This procedure was medically necessary because the lesions that were treated were: Detail Level: Detailed Cantharone Duration Text (Please Remove Duration From Postcare): The patient was instructed to leave the Cantharone on for 6-8 hours and then wash the area well with soap and water. Add 52 Modifier (Optional): no Cantharone Forte Duration Text (Please Remove Duration From Postcare): The patient was instructed to leave the Cantharone Forte on for 6-8 hours and then wash the area well with soap and water. Include Z78.9 (Other Specified Conditions Influencing Health Status) As An Associated Diagnosis?: Yes

## 2022-12-20 NOTE — FLOWSHEET NOTE
Left knee dressing changed as ordered and hemovac drain removed. Dressing applied to hemovac side and Aquacell dressing to incision. No active drainage noted. Tolerated well.  Continue to monitor

## 2022-12-21 NOTE — OP NOTE
[address], [place] [address]                                OPERATIVE REPORT    PATIENT NAME: Delroy Calderón                       :        1965  MED REC NO:   [id number]                              ROOM:       0302  ACCOUNT NO:   [de-identified]                           ADMIT DATE: 2022  PROVIDER:     Olvin Steen    DATE OF PROCEDURE:  2022    PREOPERATIVE DIAGNOSIS:  Degenerative arthritis of left knee. POSTOPERATIVE DIAGNOSIS:  Degenerative arthritis of left knee. PROCEDURE PERFORMED:  Left total knee replacement using a Kaiden Persona  knee, size 10 cemented femoral component, size G stemmed tibial plateau,  a 05-FE MC poly, and a 35-mm patellar dome. SURGEON:  Dr. Olvin Steen. ANESTHESIA:  General.    DESCRIPTION OF PROCEDURE:  The patient was brought into the operating  room and placed in the supine position on the operating table. Attempt  was made at a spinal anesthetic. This was unsuccessful. General  anesthetic was then administered. The patient's left lower extremity  was prepped with ChloraPrep and draped in a sterile fashion. The  patient received Ancef. A 10-cm medial parapatellar quad-sparing  incision was made. This was taken down through the skin and  subcutaneous tissue. Arthrotomy was made along the medial border of the  patellar tendon through the medial retinaculum and medial quad snip was  made at the superior pole of the patella. Fat pad was then excised. Central drill hole was placed down the femoral canal and a distal  femoral cut was set at 5 degrees of valgus. Femoral tower marked for 3  degrees of external rotation. A size 10 four-in-one cutting block was  then used. Surface of tibia was then osteotomized with the tibial jig. Remnants of menisci were removed. Posterior capsule was injected with  20 mL of the total joint compound.   Undersurface of patella was then  osteotomized and it was elected to go with a 35-mm patellar dome. [place] holes were placed. Knee was then cleansed with Simpulse with  gentamicin in the irrigation solution. Trial reduction was then carried  out. Knee was noted to be well balanced both in flexion and extension. The tourniquet was then elevated to 350 mmHg. Knee was again cleansed  with Simpulse and dried. Methyl methacrylate was prepared. The tibial  component was then cemented into place. Second batch cement was used to  cement the femoral component in place and finally the patellar dome was  cemented into place. All excess cement was removed. A 10-mm MC poly  was then inserted. Tourniquet was released. Good hemostasis was noted. Vancomycin powder was sprinkled into the knee joint proper. Deep  fascial layer was closed with #1 Vicryl over two figure-of-eight  sutures. Remainder of the gram of vancomycin was sprinkled in the  subcutaneous layer and this was closed with 2-0 Vicryl followed by 3-0  Monocryl subcuticular stitch. This was dressed with Steri-Strips,  fluffs, ABD, Kerlix roll, and an ACE wrap from toe to groin. Estimated  blood loss 550 mL. MODIFIER 22: The patient has a BMI of 41. This took an additional five  minutes of setup time, additional 45 minutes of surgical time, and  additional 10 minutes for a layered closure. YARY Chanel    D: 12/20/2022 11:40:23       T: 12/20/2022 11:45:12     [email or web address]  Job#: [id number]     Doc#: [id number]    CC:

## 2025-01-19 NOTE — FLOWSHEET NOTE
Reviewed discharge instructions with patient and his wife. Voices understanding. Waiting for PT to work with him 1 more time before discharge. Writer obtained verbal permission from patient to provide caller Nils information on patient condition; forwarded to RN